# Patient Record
Sex: MALE | Race: WHITE | Employment: FULL TIME | URBAN - METROPOLITAN AREA
[De-identification: names, ages, dates, MRNs, and addresses within clinical notes are randomized per-mention and may not be internally consistent; named-entity substitution may affect disease eponyms.]

---

## 2020-09-14 ENCOUNTER — HOSPITAL ENCOUNTER (OUTPATIENT)
Dept: GENERAL RADIOLOGY | Age: 50
Discharge: HOME | End: 2020-09-14
Attending: PAIN MEDICINE
Payer: COMMERCIAL

## 2020-09-14 DIAGNOSIS — G89.4 CHRONIC PAIN SYNDROME: ICD-10-CM

## 2020-09-14 PROCEDURE — 72080 X-RAY EXAM THORACOLMB 2/> VW: CPT

## 2022-09-15 ENCOUNTER — OFFICE VISIT (OUTPATIENT)
Dept: ORTHOPEDIC SURGERY | Age: 52
End: 2022-09-15
Payer: COMMERCIAL

## 2022-09-15 VITALS — HEIGHT: 75 IN | BODY MASS INDEX: 34.82 KG/M2 | WEIGHT: 280 LBS

## 2022-09-15 DIAGNOSIS — G89.29 CHRONIC BILATERAL LOW BACK PAIN WITH LEFT-SIDED SCIATICA: Primary | ICD-10-CM

## 2022-09-15 DIAGNOSIS — M54.2 NECK PAIN: ICD-10-CM

## 2022-09-15 DIAGNOSIS — M48.02 CERVICAL STENOSIS OF SPINE: ICD-10-CM

## 2022-09-15 DIAGNOSIS — M54.42 CHRONIC BILATERAL LOW BACK PAIN WITH LEFT-SIDED SCIATICA: Primary | ICD-10-CM

## 2022-09-15 DIAGNOSIS — M48.061 SPINAL STENOSIS OF LUMBAR REGION WITHOUT NEUROGENIC CLAUDICATION: ICD-10-CM

## 2022-09-15 DIAGNOSIS — Z98.1 STATUS POST CERVICAL SPINAL FUSION: ICD-10-CM

## 2022-09-15 PROCEDURE — 99204 OFFICE O/P NEW MOD 45 MIN: CPT | Performed by: PHYSICIAN ASSISTANT

## 2022-09-15 RX ORDER — CARVEDILOL 25 MG/1
TABLET ORAL 2 TIMES DAILY
COMMUNITY
Start: 2022-07-17

## 2022-09-15 RX ORDER — MELOXICAM 15 MG/1
15 TABLET ORAL DAILY
COMMUNITY
Start: 2021-12-13 | End: 2023-02-07

## 2022-09-15 RX ORDER — LISINOPRIL 10 MG/1
TABLET ORAL DAILY
COMMUNITY
Start: 2022-07-25

## 2022-09-15 RX ORDER — ATORVASTATIN CALCIUM 20 MG/1
TABLET, FILM COATED ORAL
COMMUNITY
Start: 2022-07-25

## 2022-09-15 RX ORDER — FLUTICASONE PROPIONATE AND SALMETEROL 100; 50 UG/1; UG/1
1 POWDER RESPIRATORY (INHALATION) EVERY 12 HOURS
COMMUNITY

## 2022-09-15 RX ORDER — PREGABALIN 100 MG/1
CAPSULE ORAL 2 TIMES DAILY
COMMUNITY
Start: 2022-06-23

## 2022-09-15 RX ORDER — DULOXETIN HYDROCHLORIDE 30 MG/1
30 CAPSULE, DELAYED RELEASE ORAL 2 TIMES DAILY
COMMUNITY
Start: 2022-07-17

## 2022-09-15 RX ORDER — AMLODIPINE BESYLATE 5 MG/1
TABLET ORAL
COMMUNITY
Start: 2022-08-26

## 2022-09-15 RX ORDER — HYDROCHLOROTHIAZIDE 25 MG/1
TABLET ORAL DAILY
COMMUNITY
Start: 2022-07-21

## 2022-09-15 RX ORDER — FUROSEMIDE 40 MG/1
TABLET ORAL
COMMUNITY
Start: 2022-07-31 | End: 2022-11-03

## 2022-09-15 NOTE — PROGRESS NOTES
1. Have you been to the ER, urgent care clinic since your last visit? Hospitalized since your last visit? No    2. Have you seen or consulted any other health care providers outside of the [address] [place] [address] since your last visit? Include any pap smears or colon screening.  No    Chief Complaint   Patient presents with    Back Pain     Low Back, Left Leg, Left Foot Drop    Neck Pain

## 2022-09-15 NOTE — PROGRESS NOTES
Venus Thompson (: 1970) is a 49 y.o. male patient here for evaluation of the following chief complaint(s):  Back Pain (Low Back, Left Leg, Left Foot Drop) and Neck Pain         ASSESSMENT/PLAN:  Below is the assessment and plan developed based on review of pertinent history, physical exam, labs, studies, and medications. 1. Chronic bilateral low back pain with left-sided sciatica  -     XR SPINE LUMB MIN 4 V; Future  -     CT SPINE LUMB W CONT; Future  -     XR MYELO LUMBAR; Future  2. Neck pain  -     XR SPINE CERV 4 OR 5 V; Future  -     MRI CERV SPINE WO CONT; Future  3. Status post cervical spinal fusion  -     MRI CERV SPINE WO CONT; Future  4. Cervical stenosis of spine  -     MRI CERV SPINE WO CONT; Future  5. Spinal stenosis of lumbar region without neurogenic claudication  -     CT SPINE LUMB W CONT; Future  -     XR MYELO LUMBAR; Future      The patient's radiologic findings have been reviewed with him in detail today. He has a long standing history of chronic low back pain with residual right lower extremity weakness and residual left lower extremity radicular pain. He is status post laminectomy from 2019 and subsequent dorsal column stimulator implantation from 2020. Unfortunately despite his dorsal column stimulator and Lyrica, he has had continued and severe left lower extremity radiculopathy. He is having persistent weakness in his right leg and is having increased falls. He has a MRI lumbar spine from nearly 1 year ago without substantial compressive pathology. Given his persistent symptoms despite conservative management and dorsal column stimulator implantation as well as medication use and home exercises, would like for him to obtain a CT myelogram.    He is also exhibiting signs of cervical myelopathy with increasing falls and balance issues. His lower leg symptoms may be a result of cervical stenosis as well.   He has evidence of atrophy and weakness in his right hand that has been worsening progressively over the past month. I would like for him to obtain MRI of the cervical spine for further evaluation of cord compression. Return for a follow-up visit after his CT myelogram and MRIs are complete. He will use over-the-counter medications and continue with Lyrica per his PCP. Return for CT follow up, MRI follow up, With Dr. Susan Zeng. SUBJECTIVE/OBJECTIVE:  Sandi Ryan (: 1970) is a 49 y.o. male who presents today for the following:  Chief Complaint   Patient presents with    Back Pain     Low Back, Left Leg, Left Foot Drop    Neck Pain        Back Pain   Associated symptoms include numbness and weakness. Neck Pain     Mr. Kristan Christie as a new patient to the practice. He is status post prior C5-6 ACDF from  per Dr. Hannah García. Status post lumbar laminectomy from  per Dr. Hannah García as well. He had cord compression which prompted his ACDF with an acute HNP which prompted his lumbar ectomy. He states that he has had residual right leg weakness and persistent left lower extremity radiculopathy after his laminectomy. Ultimately due to his persistent pain, he had a dorsal column stimulator implanted by [facility] in . He continues with constant low back pain with left anterolateral thigh pain to the knee despite his stimulator. He is currently on meloxicam and Lyrica per his PCP. He states that his stimulator has not been helping in relieving his symptoms enough, however this remains unchanged. No numbness or tingling in the legs. He does report chronic right foot weakness. No bowel or bladder changes. He has also been seen by Gail Jeong last year and had MRI of the lumbar spine from nearly 1 year ago. Over the past several months, he has had difficulty with increasing balance issues and falls. He has had 2 falls over the past several months. He has had increasing posterior neck pain with radiation of the mid scapular region. No specific arm pain, but he does report numbness and tingling in his hands bilaterally. He has had increasing weakness in his right hand over the past month and notices difficulty with dexterity in the right hand and atrophy in the right forearm. IMAGING:  XR Results (most recent):  Results from Appointment encounter on 09/15/22    XR SPINE CERV 4 OR 5 V    Narrative  AP and lateral cervical spine demonstrates a stable cervical fusion C5-C6. No hardware complications noted. Spondylosis above and below. Spurring noted. No gross instability. XR SPINE LUMBAR  4 views of the lumbar spine were reviewed today. Previous laminectomy noted. Spinal cord stimulator placement with percutaneous leads noted. No gross instability with flexion-extension. No fractures or lytic lesion. MRI Results (most recent): 10/2021  Safety detector utilized prior to scan. NKI, Lower back pain. ,   T1/T2/STIR SAG, T2 AXIAL lwq. HISTORY:   Low back pain and lumbar radiculopathy with prior surgery     TECHNICAL FACTORS: , T1/T2/STIR SAG, T2 AXIAL. COMPARISON:  None. FINDINGS:  The L5-S1 level shows concentric bulging disc without focal disc herniation. Facet arthropathy. The neural foramina patent. The L4-5 level shows a prior left-sided laminectomy. Concentric bulging disc without of recurrent disc herniation at this level. Facet arthropathy. Moderate left L4-5 recess stenosis. The neural foramina patent. The L3-4 level shows concentric bulging disc and annular tears without focal disc herniation or central spinal stenosis. The neural foramina patent. The L2-3, L1-2 and T12-L1 levels show no evidence of disc herniation or spinal stenosis. The neural foramina patent. Conus and cauda equina are normal.     Paravertebral soft tissues are normal.     Prominent physiologic fat the posterior epidural space. No fracture dislocation identified. CONCLUSION:   1.  Postop changes at the L4-5 level the prior left-sided laminectomy and concentric bulging disc without recurrent disc herniation at this level. 2. Moderate left L4-5 recess stenosis. 3. Disc desiccation, concentric bulging disc at the L3-4 and L5-S1 levels with no other sites of lumbar disc herniation or conus or cauda equina compression. Thank you for the opportunity to provide your interpretation. Lindbergh Gaucher, MD      No Known Allergies    Current Outpatient Medications   Medication Sig    amLODIPine (NORVASC) 5 mg tablet     atorvastatin (LIPITOR) 20 mg tablet     carvediloL (COREG) 25 mg tablet     DULoxetine (CYMBALTA) 30 mg capsule     furosemide (LASIX) 40 mg tablet     hydroCHLOROthiazide (HYDRODIURIL) 25 mg tablet     lisinopriL (PRINIVIL, ZESTRIL) 10 mg tablet     meloxicam (MOBIC) 15 mg tablet Take 15 mg by mouth daily. pregabalin (LYRICA) 100 mg capsule     fluticasone propion-salmeteroL (Advair Diskus) 100-50 mcg/dose diskus inhaler Take 1 Puff by inhalation every twelve (12) hours. No current facility-administered medications for this visit. History reviewed. No pertinent past medical history. History reviewed. No pertinent surgical history. History reviewed. No pertinent family history. Social History     Tobacco Use    Smoking status: Never    Smokeless tobacco: Never   Substance Use Topics    Alcohol use: Not on file        Review of Systems   Constitutional: Negative. Respiratory: Negative. Cardiovascular: Negative. Gastrointestinal: Negative. Endocrine: Negative. Genitourinary: Negative. Musculoskeletal:  Positive for back pain, gait problem, neck pain and neck stiffness. Skin: Negative. Allergic/Immunologic: Negative. Neurological:  Positive for weakness and numbness. Hematological: Negative. Psychiatric/Behavioral: Negative. All other systems reviewed and are negative. No flowsheet data found.         Vitals:  Ht 6' 3\" (1.905 m)   Wt 280 lb (127 kg)   BMI 35.00 kg/m²    Body mass index is 35 kg/m². Physical Exam    Neurologic  Sensory  Light Touch - Intact - Globally. Overall Assessment of Muscle Strength and Tone reveals  Upper Extremities - Right Deltoid - 5/5. Left Deltoid - 5/5. Right Bicep - 5/5. Left Bicep - 5/5. Right Tricep - 5/5. Left Tricep - 5/5. Right Wrist Extensors - 5/5. Left Wrist Extensors - 5/5. Right Wrist Flexors - 5/5. Left Wrist Flexors - 5/5. Right Intrinsics - 3/5. Left Intrinsics - 5/5. Lower Extremities - Right Iliopsoas - 5/5. Left Iliopsoas - 5/5. Right Tibialis Anterior - 5/5. Left Tibialis Anterior - 5/5. Right Gastroc-Soleus - 5/5. Left Gastroc-Soleus - 5/5. Right EHL - 4/5. Left EHL - 5/5. Right medial forearm atrophy noted. General Assessment of Reflexes  Right Hand - Mcintosh's sign is positive in the right hand. Left Hand - Mcintosh's sign is positive in the left hand. Right Ankle - Clonus is not present. Left Ankle - Clonus is not present. Reflexes (Dermatomes)  2/2 Normal - Left Bicep (C5-6), Left Tricep (C7-8), Left Brachioradialis (C5-6), Right Bicep (C5-6), Right Tricep (C7-8) and Right Brachioradialis (C5-6). Left Achilles (L5-S2), Left Knee (L2-4), Right Achilles (L5-S2) and Right Knee (L2-4). Musculoskeletal  Global Assessment  Examination of related systems reveals - well-developed, well-nourished, in no acute distress, alert and oriented x 3 and normal coordination. Gait and Station - normal gait and station and normal posture. Spine/Ribs/Pelvis  Cervical Spine - Evaluation of related systems reveals - no lymphadenopathy and neurovascularly intact bilaterally. Inspection and Palpation - Tenderness - moderate and localized. Assessment of pain reveals the following findings: - Location - cervical area. ROJM - Flexion - 85 °. Right Lateral Flexion - 35 °. Left Lateral Flexion - 35 °. Extension - 70 °. Right Rotation - 80 °. Left Rotation - 80 °.  Cervical Spine - Functional Testing - Foraminal Compression/Spurling's Test negative, Shoulder Depression Test negative, Upper Limb Tension Test negative. Thoracic (Dorsal) Spine - Examination of the thoracic spine reveals - no tenderness over thoracic vertebrae, no pain, normal sensation and normal thoracic spine movements. Lumbosacral Spine - Examination of the lumbosacral spine reveals - no known fractures or deformities. Inspection and Palpation - Tenderness - moderate. Assessment of pain reveals the following findings - The pain is characterized as - moderate. Location - pain refers to lower back bilaterally. ROJM - Trunk Extension - 15 degrees. Lumbar Spine Flexion - 35 °. Lumbosacral Spine - Functional Testing - Babinski Test negative, Prone Knee Bending Test negative, Slump Test negative, Straight Leg Raising Test negative. Dr. Primitivo Mullins was available for immediate consult during this encounter. An electronic signature was used to authenticate this note.   -- Gerold Angelucci, PA

## 2022-10-05 ENCOUNTER — HOSPITAL ENCOUNTER (OUTPATIENT)
Dept: MRI IMAGING | Age: 52
Discharge: HOME | End: 2022-10-05
Attending: ORTHOPAEDIC SURGERY
Payer: COMMERCIAL

## 2022-10-05 DIAGNOSIS — M54.2 NECK PAIN: ICD-10-CM

## 2022-10-05 DIAGNOSIS — M48.02 CERVICAL STENOSIS OF SPINE: ICD-10-CM

## 2022-10-05 DIAGNOSIS — Z98.1 STATUS POST CERVICAL SPINAL FUSION: ICD-10-CM

## 2022-10-05 PROCEDURE — 72141 MRI NECK SPINE W/O DYE: CPT

## 2022-10-06 ENCOUNTER — HOSPITAL ENCOUNTER (OUTPATIENT)
Dept: GENERAL RADIOLOGY | Age: 52
Discharge: HOME | End: 2022-10-06
Attending: ORTHOPAEDIC SURGERY
Payer: COMMERCIAL

## 2022-10-06 ENCOUNTER — HOSPITAL ENCOUNTER (OUTPATIENT)
Dept: CT IMAGING | Age: 52
Discharge: HOME | End: 2022-10-06
Attending: ORTHOPAEDIC SURGERY
Payer: COMMERCIAL

## 2022-10-06 DIAGNOSIS — G89.29 CHRONIC BILATERAL LOW BACK PAIN WITH LEFT-SIDED SCIATICA: ICD-10-CM

## 2022-10-06 DIAGNOSIS — M54.42 CHRONIC BILATERAL LOW BACK PAIN WITH LEFT-SIDED SCIATICA: ICD-10-CM

## 2022-10-06 DIAGNOSIS — M48.061 SPINAL STENOSIS OF LUMBAR REGION WITHOUT NEUROGENIC CLAUDICATION: ICD-10-CM

## 2022-10-06 PROCEDURE — 74011000250 HC RX REV CODE- 250: Performed by: RADIOLOGY

## 2022-10-06 PROCEDURE — 74011000636 HC RX REV CODE- 636: Performed by: RADIOLOGY

## 2022-10-06 PROCEDURE — 62304 MYELOGRAPHY LUMBAR INJECTION: CPT

## 2022-10-06 PROCEDURE — 72132 CT LUMBAR SPINE W/DYE: CPT

## 2022-10-06 RX ORDER — LIDOCAINE HYDROCHLORIDE 20 MG/ML
5 INJECTION, SOLUTION EPIDURAL; INFILTRATION; INTRACAUDAL; PERINEURAL
Status: COMPLETED | OUTPATIENT
Start: 2022-10-06 | End: 2022-10-06

## 2022-10-06 RX ADMIN — LIDOCAINE HYDROCHLORIDE 100 MG: 20 INJECTION, SOLUTION INTRAVENOUS at 09:23

## 2022-10-06 RX ADMIN — IOHEXOL 15 ML: 180 INJECTION INTRAVENOUS at 09:22

## 2022-10-25 ENCOUNTER — OFFICE VISIT (OUTPATIENT)
Dept: ORTHOPEDIC SURGERY | Age: 52
End: 2022-10-25
Payer: COMMERCIAL

## 2022-10-25 VITALS — WEIGHT: 280 LBS | BODY MASS INDEX: 34.82 KG/M2 | HEIGHT: 75 IN

## 2022-10-25 DIAGNOSIS — G89.29 CHRONIC BILATERAL LOW BACK PAIN WITH LEFT-SIDED SCIATICA: Primary | ICD-10-CM

## 2022-10-25 DIAGNOSIS — M50.90 CERVICAL DISC DISEASE: ICD-10-CM

## 2022-10-25 DIAGNOSIS — M54.2 NECK PAIN: ICD-10-CM

## 2022-10-25 DIAGNOSIS — M54.42 CHRONIC BILATERAL LOW BACK PAIN WITH LEFT-SIDED SCIATICA: Primary | ICD-10-CM

## 2022-10-25 DIAGNOSIS — Z98.1 STATUS POST CERVICAL SPINAL FUSION: ICD-10-CM

## 2022-10-25 DIAGNOSIS — M48.061 SPINAL STENOSIS OF LUMBAR REGION WITHOUT NEUROGENIC CLAUDICATION: ICD-10-CM

## 2022-10-25 PROCEDURE — 99214 OFFICE O/P EST MOD 30 MIN: CPT | Performed by: ORTHOPAEDIC SURGERY

## 2022-10-25 NOTE — PATIENT INSTRUCTIONS
Lumbar Laminectomy: Before Your Surgery  What is a lumbar laminectomy? A lumbar laminectomy is surgery to ease pressure on the spinal cord and/or nerves of the lower spine. This is also called decompression surgery. The doctor makes a cut in the lower back. This cut is called an incision. Then the doctor takes out pieces of bone that are squeezing the spinal cord and nerves. The doctor may also take out other tissues. Many people have less pain soon after surgery. But your back may feel stiff and sore for a few months. Depending on the type of surgery you have, and your health, you may go home the same day. Or you may stay in the hospital for 1 or 2 days. You will likely return to work in 2 to 4 weeks. But if your job requires physical labor, it may take 4 to 8 weeks. How do you prepare for surgery? Surgery can be stressful. This information will help you understand what you can expect. And it will help you safely prepare for surgery. Preparing for surgery    Be sure you have someone to take you home. Anesthesia and pain medicine will make it unsafe for you to drive or get home on your own. Understand exactly what surgery is planned, along with the risks, benefits, and other options. If you take a medicine that prevents blood clots, your doctor may tell you to stop taking it before your surgery. Or your doctor may tell you to keep taking it. (These medicines include aspirin and other blood thinners.) Make sure that you understand exactly what your doctor wants you to do. Tell your doctor ALL the medicines, vitamins, supplements, and herbal remedies you take. Some may increase the risk of problems during your surgery. Your doctor will tell you if you should stop taking any of them before the surgery and how soon to do it. Make sure your doctor and the hospital have a copy of your advance directive. If you don't have one, you may want to prepare one.  It lets others know your health care wishes. It's a good thing to have before any type of surgery or procedure. What happens on the day of surgery? Follow the instructions exactly about when to stop eating and drinking. If you don't, your surgery may be canceled. If your doctor has told you to take your medicines on the day of surgery, take them with only a sip of water. Take a bath or shower before you come in for your surgery. Do not apply lotions, perfumes, deodorants, or nail polish. Do not shave the surgical site yourself. Take off all jewelry and piercings. And take out contact lenses, if you wear them. At the hospital or surgery center   Bring a picture ID. The area for surgery is often marked to make sure there are no errors. You will be kept comfortable and safe by your anesthesia provider. You will be asleep during the surgery. The surgery will take about 1 to 1½ hours. If a spinal fusion is done at the same time, surgery will last 2 to 4 hours. When should you call your doctor? You have questions or concerns. You don't understand how to prepare for your surgery. You become ill before the surgery (such as fever, flu, or a cold). You need to reschedule or have changed your mind about having the surgery. Where can you learn more? Go to http://www.gray.com/  Enter R777 in the search box to learn more about \"Lumbar Laminectomy: Before Your Surgery. \"  Current as of: March 9, 2022               Content Version: 13.4  © 2006-2022 [employer]. Care instructions adapted under license by [employer] (which disclaims liability or warranty for this information). If you have questions about a medical condition or this instruction, always ask your healthcare professional. [address] any warranty or liability for your use of this information.

## 2022-10-25 NOTE — PROGRESS NOTES
Renetta Muir (: 1970) is a 49 y.o. male patient here for evaluation of the following chief complaint(s):  LOW BACK PAIN         ASSESSMENT/PLAN:  Below is the assessment and plan developed based on review of pertinent history, physical exam, labs, studies, and medications. 1. Chronic bilateral low back pain with left-sided sciatica  2. Neck pain  3. Status post cervical spinal fusion  4. Spinal stenosis of lumbar region without neurogenic claudication  5. Cervical disc disease      The patient's radiologic findings have been reviewed with him in detail today. He has a long standing history of chronic low back pain with residual right lower extremity weakness and residual left lower extremity radicular pain. He is status post laminectomy from 2019 and subsequent dorsal column stimulator implantation from . Unfortunately despite his dorsal column stimulator and Lyrica, he has had continued and severe left lower extremity radiculopathy. He is having persistent symptoms. CT myelogram shows large disc protrusion at L4-5 with cephalad migration causing severe central and left lateral recess stenosis. I think is a candidate for a revision laminectomy for decompression with bilateral decompression at L4-5. I think we can avoid a fusion at this time. In terms of his neck he does have a right-sided protrusion at C7-T1 as well as moderate central stenosis C6-7 below his previous ACDF. Obviously would like to avoid further extension of his cervical fusion given that he most likely need a posterior approach. I am going to send him for some right-sided C6-7 and C7-T1 injections while we proceed with his lower back. Risk and benefits were discussed with him. Procedure: Revision lumbar laminectomy and discectomy L4-5      The risks and benefits were discussed at length with the patient and the patient has elected to proceed. Indications for surgery include failed conservative treatment. Alternative treatments, risks and the perioperative course were discussed with the patient. All questions were answered. The risks and benefits of the procedure were explained. Benefits include definitive diagnosis, relief of pain, elimination of deformity and improved function. Risks of surgery including bleeding, infection, weakness, numbness, CSF leak, failure to improve symptoms, exacerbation of medical co-morbidities and even death were discussed with the patient. No follow-ups on file. SUBJECTIVE/OBJECTIVE:  Jessica Manzano (: 1970) is a 49 y.o. male who presents today for the following:  Chief Complaint   Patient presents with    LOW BACK PAIN        He comes in today for follow-up. Since we last saw him he had an MRI of his cervical spine as well as a CT myelogram of his lumbar spine. He has persistent bilateral leg symptoms. It is intermittently painful in the right and left lower extremities. Initially was worse on the left side. The back pain is worse with activities. He denies any bowel bladder difficulties. He does have some unsteadiness of his gait. Back Pain   Associated symptoms include numbness and weakness. Neck Pain    LOW BACK PAIN     Mr. Jerome Speaker as a new patient to the practice. He is status post prior C5-6 ACDF from  per Dr. Donny Butcher. Status post lumbar laminectomy from  per Dr. Donny Butcher as well. He had cord compression which prompted his ACDF with an acute HNP which prompted his lumbar ectomy. He states that he has had residual right leg weakness and persistent left lower extremity radiculopathy after his laminectomy. Ultimately due to his persistent pain, he had a dorsal column stimulator implanted by [facility] in . He continues with constant low back pain with left anterolateral thigh pain to the knee despite his stimulator. He is currently on meloxicam and Lyrica per his PCP.   He states that his stimulator has not been helping in relieving his symptoms enough, however this remains unchanged. No numbness or tingling in the legs. He does report chronic right foot weakness. No bowel or bladder changes. He has also been seen by [facility] last year and had MRI of the lumbar spine from nearly 1 year ago. Over the past several months, he has had difficulty with increasing balance issues and falls. He has had 2 falls over the past several months. He has had increasing posterior neck pain with radiation of the mid scapular region. No specific arm pain, but he does report numbness and tingling in his hands bilaterally. He has had increasing weakness in his right hand over the past month and notices difficulty with dexterity in the right hand and atrophy in the right forearm. IMAGING:  XR Results (most recent):  Results from Hospital Encounter encounter on 10/06/22    XR MYELO LUMBAR    Narrative  PROCEDURE:  FLUOROSCOPIC GUIDED LUMBAR SPINE MYELOGRAM    HISTORY: Sai Robles is a 49years old Male with Chronic bilateral low  back pain with left-sided sciatica . :  Mary Porter NP; Salvatore Moore NP    ATTENDING:  Dr. Juan Antonio Chamorro:  After full discussion of the procedure, including risks, benefits and  alternatives, both verbal and written consent were obtained. TECHNIQUE: A timeout was called to verify the correct patient, procedure, site  and allergies. The patient was placed prone on the fluoroscopy table. Initial  images  were obtained. An appropriate site for myelogram was marked at the L5-S1 level. The skin was prepped and draped in sterile fashion. 1% lidocaine was utilized  for local anesthesia. A 22 gauge spinal needle was then inserted under  fluoroscopic guidance into the thecal sac. There was prompt return of  cerebrospinal fluid through the needle. Approximately 10 ml of Omnipaque 180  contrast was administered intrathecally under spot fluoroscopic guidance. The  needle was removed. Pressure was applied locally at the puncture site. A dry  sterile dressing was applied. There were no immediate complications. The  patient tolerated the procedure without difficulty. Additional fluoroscopic images were then obtained in multiple obliquities. COMPLICATIONS:  None    ESTIMATED BLOOD LOSS:  < 1 ml    AIR KERMA:  107.85 mGy    Impression  Technically successful fluoroscopic guided lumbar spine myelogram.  Separately  dictated CT myelogram report to follow. XR SPINE LUMBAR  4 views of the lumbar spine were reviewed today. Previous laminectomy noted. Spinal cord stimulator placement with percutaneous leads noted. No gross instability with flexion-extension. No fractures or lytic lesion. MRI Results (most recent): 10/2021    MRI Results (most recent):  Results from [place] encounter on 10/05/22    MRI CERV SPINE WO CONT    Narrative  EXAM: MRI CERV SPINE WO CONT  Clinical history: Cervical radiculopathy  INDICATION: . Cervicalgia    COMPARISON: None    TECHNIQUE: MR imaging of the cervical spine was performed using the following  sequences: sagittal T1, T2, STIR;  axial T2, T1.    CONTRAST:  None. FINDINGS:    There is congenital narrowing of the cervical canal. There is no Chiari or  syrinx. There is no fracture or dislocation. Vertebral body heights are  maintained. Trace cord myelomalacia is likely chronic in nature and related to  chronic degenerative changes at C5-6. The craniocervical junction is intact. Status post ACDF at C5-6 with partial  osseous fusion. The paraspinal soft tissues are within normal limits. C2-C3: Disc bulge. Mild facet arthropathy. Canal is patent. Foramina are patent. C3-C4: Mild facet arthropathy. Mild central protrusion. Mild canal stenosis. Mild right foraminal stenosis. C4-C5: Mild facet arthropathy. Circumferential bulge/osteophyte. Mild canal  stenosis. Foramina are patent.     C5-C6: Status post ACDF. Facet arthropathy is greater on the left. Canal  stenosis is mild. Moderate left foraminal stenosis. C6-C7: Disc desiccation loss of disc height. Circumferential  protrusion/osteophyte. Mild facet arthropathy. Moderate canal stenosis. Severe  left foraminal stenosis. Moderate right foraminal stenosis. C7-T1: Disc desiccation. Lobulated protrusion/osteophyte. Canal is patent. Moderate to severe right and mild left foraminal stenosis. Impression  Multilevel disc and facet degenerative change with congenital narrowing of the  cervical canal.    There is moderate canal, severe left and moderate right foraminal stenosis at  C6-7. Moderate to severe right and mild left foraminal stenosis at C7-T1. Status post ACDF at C5-6 with moderate left foraminal stenosis at C5-6. No  unusual postprocedural findings at C5-6. CT Results (most recent):  Results from Hospital Encounter encounter on 10/06/22    CT SPINE LUMB W CONT    Narrative  EXAM:  CT SPINE LUMB W CONT    INDICATION:  Lumbago and sciatica, left side    COMPARISON: Fluoroscopy-guided lumbar myelogram 10/6/2022, lumbosacral spine  radiographs 9/15/2022, MRI lumbar spine 10/4/2021    TECHNIQUE:  Following the lumbar myelogram thin spiral axial images were obtained from  approximately T12 down through the sacrum. Sagittal and coronal reconstructions  were created. CT dose reduction was achieved through use of a standardized  protocol tailored for this examination and automatic exposure control for dose  modulation. FINDINGS:    Alignment is within normal limits. There is no fracture or compression  deformity. Lower spinal cord is normal in appearance. Conus terminates at L1-L2. Spinal stimulator. Partially visualized 2.7 cm right adrenal adenoma; no  dedicated follow-up recommended. Atherosclerosis. Lower thoracic spine: No herniation or stenosis. L1-L2: Mild facet arthropathy. No stenosis. L2-L3: Moderate facet arthropathy.  No stenosis. L3-L4: Disc bulge with central annular fissure. Mild facet arthropathy. Ligament  flavum thickening. Mild spinal stenosis. Mild left foraminal stenosis. L4-L5: Inferiorly extending left subarticular disc extrusion impinging the  descending left L5 and S1 nerve roots. Moderate facet arthropathy. Moderate  spinal stenosis. Moderate left and mild right foraminal stenosis. L5-S1: Disc bulge. Endplate osteophytes. Moderate facet arthropathy. No spinal  stenosis. Mild left foraminal stenosis. Impression  1. Study note: Patient underwent a lumbar spine MRI on 10/4/2021 with the  spinal stimulator in place. If the patient did not experience any issues with  that exam, consider future evaluation with MRI to potentially avoid another  myelography procedure. 2. L4-L5 inferiorly extending left subarticular disc extrusion impinging the  descending left L5 and S1 nerve roots. Moderate spinal canal stenosis. Moderate  left neuroforaminal stenosis, and mild right neuroforaminal stenosis at this  level as well. 3.  L3-L4 mild spinal canal and mild left foraminal stenosis. 4. L5-S1 mild left foraminal stenosis. 5.  Incidental right adrenal adenoma. No Known Allergies    Current Outpatient Medications   Medication Sig    amLODIPine (NORVASC) 5 mg tablet     atorvastatin (LIPITOR) 20 mg tablet     carvediloL (COREG) 25 mg tablet     DULoxetine (CYMBALTA) 30 mg capsule     furosemide (LASIX) 40 mg tablet     hydroCHLOROthiazide (HYDRODIURIL) 25 mg tablet     lisinopriL (PRINIVIL, ZESTRIL) 10 mg tablet     meloxicam (MOBIC) 15 mg tablet Take 15 mg by mouth daily. pregabalin (LYRICA) 100 mg capsule     fluticasone propion-salmeteroL (ADVAIR/WIXELA) 100-50 mcg/dose diskus inhaler Take 1 Puff by inhalation every twelve (12) hours. No current facility-administered medications for this visit. History reviewed. No pertinent past medical history. History reviewed.  No pertinent surgical history. History reviewed. No pertinent family history. Social History     Tobacco Use    Smoking status: Never    Smokeless tobacco: Never   Substance Use Topics    Alcohol use: Not on file        Review of Systems   Constitutional: Negative. Respiratory: Negative. Cardiovascular: Negative. Gastrointestinal: Negative. Endocrine: Negative. Genitourinary: Negative. Musculoskeletal:  Positive for back pain, gait problem, neck pain and neck stiffness. Skin: Negative. Allergic/Immunologic: Negative. Neurological:  Positive for weakness and numbness. Hematological: Negative. Psychiatric/Behavioral: Negative. All other systems reviewed and are negative. Pain Assessment  10/25/2022   Location of Pain Back   Location Modifiers Posterior   Severity of Pain 7           Vitals:  Ht 6' 3\" (1.905 m)   Wt 280 lb (127 kg)   BMI 35.00 kg/m²    Body mass index is 35 kg/m². Physical Exam    Neurologic  Sensory  Light Touch - Intact - Globally. Overall Assessment of Muscle Strength and Tone reveals  Upper Extremities - Right Deltoid - 5/5. Left Deltoid - 5/5. Right Bicep - 5/5. Left Bicep - 5/5. Right Tricep - 5/5. Left Tricep - 5/5. Right Wrist Extensors - 5/5. Left Wrist Extensors - 5/5. Right Wrist Flexors - 5/5. Left Wrist Flexors - 5/5. Right Intrinsics - 3/5. Left Intrinsics - 5/5. Lower Extremities - Right Iliopsoas - 5/5. Left Iliopsoas - 5/5. Right Tibialis Anterior - 5/5. Left Tibialis Anterior - 5/5. Right Gastroc-Soleus - 5/5. Left Gastroc-Soleus - 5/5. Right EHL - 4/5. Left EHL - 5/5. Right medial forearm atrophy noted. General Assessment of Reflexes  Right Hand - Mcintosh's sign is positive in the right hand. Left Hand - Mcintosh's sign is positive in the left hand. Right Ankle - Clonus is not present. Left Ankle - Clonus is not present.   Reflexes (Dermatomes)  2/2 Normal - Left Bicep (C5-6), Left Tricep (C7-8), Left Brachioradialis (C5-6), Right Bicep (C5-6), Right Tricep (C7-8) and Right Brachioradialis (C5-6). Left Achilles (L5-S2), Left Knee (L2-4), Right Achilles (L5-S2) and Right Knee (L2-4). Musculoskeletal  Global Assessment  Examination of related systems reveals - well-developed, well-nourished, in no acute distress, alert and oriented x 3 and normal coordination. Gait and Station - normal gait and station and normal posture. Spine/Ribs/Pelvis  Cervical Spine - Evaluation of related systems reveals - no lymphadenopathy and neurovascularly intact bilaterally. Inspection and Palpation - Tenderness - moderate and localized. Assessment of pain reveals the following findings: - Location - cervical area. ROJM - Flexion - 85 °. Right Lateral Flexion - 35 °. Left Lateral Flexion - 35 °. Extension - 70 °. Right Rotation - 80 °. Left Rotation - 80 °. Cervical Spine - Functional Testing - Foraminal Compression/Spurling's Test negative, Shoulder Depression Test negative, Upper Limb Tension Test negative. Thoracic (Dorsal) Spine - Examination of the thoracic spine reveals - no tenderness over thoracic vertebrae, no pain, normal sensation and normal thoracic spine movements. Lumbosacral Spine - Examination of the lumbosacral spine reveals - no known fractures or deformities. Inspection and Palpation - Tenderness - moderate. Assessment of pain reveals the following findings - The pain is characterized as - moderate. Location - pain refers to lower back bilaterally. ROJM - Trunk Extension - 15 degrees. Lumbar Spine Flexion - 35 °. Lumbosacral Spine - Functional Testing - Babinski Test negative, Prone Knee Bending Test negative, Slump Test negative, Straight Leg Raising Test negative. An electronic signature was used to authenticate this note.   -- Sheila Schmid MD

## 2022-10-26 DIAGNOSIS — M48.061 SPINAL STENOSIS OF LUMBAR REGION WITHOUT NEUROGENIC CLAUDICATION: ICD-10-CM

## 2022-10-26 DIAGNOSIS — M48.02 CERVICAL STENOSIS OF SPINE: ICD-10-CM

## 2022-10-26 DIAGNOSIS — M54.2 NECK PAIN: Primary | ICD-10-CM

## 2022-10-31 NOTE — PROGRESS NOTES
Ahmet Yip (: 1970) is a 49 y.o. male patient here for evaluation of the following chief complaint(s):  LOW BACK PAIN           ASSESSMENT/PLAN:  Below is the assessment and plan developed based on review of pertinent history, physical exam, labs, studies, and medications. 1. Chronic bilateral low back pain with left-sided sciatica  2. Neck pain  3. Status post cervical spinal fusion  4. Spinal stenosis of lumbar region without neurogenic claudication  5. Cervical disc disease        The patient's radiologic findings have been reviewed with him in detail today. He has a long standing history of chronic low back pain with residual right lower extremity weakness and residual left lower extremity radicular pain. He is status post laminectomy from 2019 and subsequent dorsal column stimulator implantation from . Unfortunately despite his dorsal column stimulator and Lyrica, he has had continued and severe left lower extremity radiculopathy. He is having persistent symptoms. CT myelogram shows large disc protrusion at L4-5 with cephalad migration causing severe central and left lateral recess stenosis. I think is a candidate for a revision laminectomy for decompression with bilateral decompression at L4-5. I think we can avoid a fusion at this time. In terms of his neck he does have a right-sided protrusion at C7-T1 as well as moderate central stenosis C6-7 below his previous ACDF. Obviously would like to avoid further extension of his cervical fusion given that he most likely need a posterior approach. I am going to send him for some right-sided C6-7 and C7-T1 injections while we proceed with his lower back. Risk and benefits were discussed with him. Procedure: Revision lumbar laminectomy and discectomy L4-5   Date of Surgery Update:  Ahmet Yip was seen and examined. History and physical has been reviewed. The patient has been examined.  There have been no significant clinical changes since the completion of the originally dated History and Physical.    Signed By: Sheila Schmid MD     2022 7:22 AM             The risks and benefits were discussed at length with the patient and the patient has elected to proceed. Indications for surgery include failed conservative treatment. Alternative treatments, risks and the perioperative course were discussed with the patient. All questions were answered. The risks and benefits of the procedure were explained. Benefits include definitive diagnosis, relief of pain, elimination of deformity and improved function. Risks of surgery including bleeding, infection, weakness, numbness, CSF leak, failure to improve symptoms, exacerbation of medical co-morbidities and even death were discussed with the patient. No follow-ups on file. SUBJECTIVE/OBJECTIVE:  Edward Goyal (: 1970) is a 49 y.o. male who presents today for the following:      Chief Complaint   Patient presents with    LOW BACK PAIN         He comes in today for follow-up. Since we last saw him he had an MRI of his cervical spine as well as a CT myelogram of his lumbar spine. He has persistent bilateral leg symptoms. It is intermittently painful in the right and left lower extremities. Initially was worse on the left side. The back pain is worse with activities. He denies any bowel bladder difficulties. He does have some unsteadiness of his gait. Back Pain   Associated symptoms include numbness and weakness. Neck Pain     LOW BACK PAIN     Mr. Sol Hall as a new patient to the practice. He is status post prior C5-6 ACDF from  per Dr. Mo Santos. Status post lumbar laminectomy from  per Dr. Mo Santos as well. He had cord compression which prompted his ACDF with an acute HNP which prompted his lumbar ectomy.   He states that he has had residual right leg weakness and persistent left lower extremity radiculopathy after his laminectomy. Ultimately due to his persistent pain, he had a dorsal column stimulator implanted by [facility] Pr[facility]ak[facility] in 2020. He continues with constant low back pain with left anterolateral thigh pain to the knee despite his stimulator. He is currently on meloxicam and Lyrica per his PCP. He states that his stimulator has not been helping in relieving his symptoms enough, however this remains unchanged. No numbness or tingling in the legs. He does report chronic right foot weakness. No bowel or bladder changes. He has also been seen by [address] [place] [address] last year and had MRI of the lumbar spine from nearly 1 year ago. Over the past several months, he has had difficulty with increasing balance issues and falls. He has had 2 falls over the past several months. He has had increasing posterior neck pain with radiation of the mid scapular region. No specific arm pain, but he does report numbness and tingling in his hands bilaterally. He has had increasing weakness in his right hand over the past month and notices difficulty with dexterity in the right hand and atrophy in the right forearm. IMAGING:  XR Results (most recent):  Results from Hospital Encounter encounter on 10/06/22     XR MYELO LUMBAR     Narrative  PROCEDURE:  FLUOROSCOPIC GUIDED LUMBAR SPINE MYELOGRAM     HISTORY: Raegan Beatty is a 49years old Male with Chronic bilateral low  back pain with left-sided sciatica . :  Los Nava NP; Sugey Damon NP     ATTENDING:  Dr. Beni Sibley:  After full discussion of the procedure, including risks, benefits and  alternatives, both verbal and written consent were obtained. TECHNIQUE: A timeout was called to verify the correct patient, procedure, site  and allergies. The patient was placed prone on the fluoroscopy table. Initial  images  were obtained. An appropriate site for myelogram was marked at the L5-S1 level.   The skin was prepped and draped in sterile fashion. 1% lidocaine was utilized  for local anesthesia. A 22 gauge spinal needle was then inserted under  fluoroscopic guidance into the thecal sac. There was prompt return of  cerebrospinal fluid through the needle. Approximately 10 ml of Omnipaque 180  contrast was administered intrathecally under spot fluoroscopic guidance. The  needle was removed. Pressure was applied locally at the puncture site. A dry  sterile dressing was applied. There were no immediate complications. The  patient tolerated the procedure without difficulty. Additional fluoroscopic images were then obtained in multiple obliquities. COMPLICATIONS:  None     ESTIMATED BLOOD LOSS:  < 1 ml     AIR KERMA:  107.85 mGy     Impression  Technically successful fluoroscopic guided lumbar spine myelogram.  Separately  dictated CT myelogram report to follow. XR SPINE LUMBAR  4 views of the lumbar spine were reviewed today. Previous laminectomy noted. Spinal cord stimulator placement with percutaneous leads noted. No gross instability with flexion-extension. No fractures or lytic lesion. MRI Results (most recent): 10/2021     MRI Results (most recent):  Results from [place] encounter on 10/05/22     MRI CERV SPINE WO CONT     Narrative  EXAM: MRI CERV SPINE WO CONT  Clinical history: Cervical radiculopathy  INDICATION: . Cervicalgia     COMPARISON: None     TECHNIQUE: MR imaging of the cervical spine was performed using the following  sequences: sagittal T1, T2, STIR;  axial T2, T1.     CONTRAST:  None. FINDINGS:     There is congenital narrowing of the cervical canal. There is no Chiari or  syrinx. There is no fracture or dislocation. Vertebral body heights are  maintained. Trace cord myelomalacia is likely chronic in nature and related to  chronic degenerative changes at C5-6. The craniocervical junction is intact. Status post ACDF at C5-6 with partial  osseous fusion.      The paraspinal soft tissues are within normal limits. C2-C3: Disc bulge. Mild facet arthropathy. Canal is patent. Foramina are patent. C3-C4: Mild facet arthropathy. Mild central protrusion. Mild canal stenosis. Mild right foraminal stenosis. C4-C5: Mild facet arthropathy. Circumferential bulge/osteophyte. Mild canal  stenosis. Foramina are patent. C5-C6: Status post ACDF. Facet arthropathy is greater on the left. Canal  stenosis is mild. Moderate left foraminal stenosis. C6-C7: Disc desiccation loss of disc height. Circumferential  protrusion/osteophyte. Mild facet arthropathy. Moderate canal stenosis. Severe  left foraminal stenosis. Moderate right foraminal stenosis. C7-T1: Disc desiccation. Lobulated protrusion/osteophyte. Canal is patent. Moderate to severe right and mild left foraminal stenosis. Impression  Multilevel disc and facet degenerative change with congenital narrowing of the  cervical canal.     There is moderate canal, severe left and moderate right foraminal stenosis at  C6-7. Moderate to severe right and mild left foraminal stenosis at C7-T1. Status post ACDF at C5-6 with moderate left foraminal stenosis at C5-6. No  unusual postprocedural findings at C5-6. CT Results (most recent):  Results from Hospital Encounter encounter on 10/06/22     CT SPINE LUMB W CONT     Narrative  EXAM:  CT SPINE LUMB W CONT     INDICATION:  Lumbago and sciatica, left side     COMPARISON: Fluoroscopy-guided lumbar myelogram 10/6/2022, lumbosacral spine  radiographs 9/15/2022, MRI lumbar spine 10/4/2021     TECHNIQUE:  Following the lumbar myelogram thin spiral axial images were obtained from  approximately T12 down through the sacrum. Sagittal and coronal reconstructions  were created. CT dose reduction was achieved through use of a standardized  protocol tailored for this examination and automatic exposure control for dose  modulation. FINDINGS:     Alignment is within normal limits.  There is no fracture or compression  deformity. Lower spinal cord is normal in appearance. Conus terminates at L1-L2. Spinal stimulator. Partially visualized 2.7 cm right adrenal adenoma; no  dedicated follow-up recommended. Atherosclerosis. Lower thoracic spine: No herniation or stenosis. L1-L2: Mild facet arthropathy. No stenosis. L2-L3: Moderate facet arthropathy. No stenosis. L3-L4: Disc bulge with central annular fissure. Mild facet arthropathy. Ligament  flavum thickening. Mild spinal stenosis. Mild left foraminal stenosis. L4-L5: Inferiorly extending left subarticular disc extrusion impinging the  descending left L5 and S1 nerve roots. Moderate facet arthropathy. Moderate  spinal stenosis. Moderate left and mild right foraminal stenosis. L5-S1: Disc bulge. Endplate osteophytes. Moderate facet arthropathy. No spinal  stenosis. Mild left foraminal stenosis. Impression  1. Study note: Patient underwent a lumbar spine MRI on 10/4/2021 with the  spinal stimulator in place. If the patient did not experience any issues with  that exam, consider future evaluation with MRI to potentially avoid another  myelography procedure. 2. L4-L5 inferiorly extending left subarticular disc extrusion impinging the  descending left L5 and S1 nerve roots. Moderate spinal canal stenosis. Moderate  left neuroforaminal stenosis, and mild right neuroforaminal stenosis at this  level as well. 3.  L3-L4 mild spinal canal and mild left foraminal stenosis. 4. L5-S1 mild left foraminal stenosis. 5.  Incidental right adrenal adenoma.          No Known Allergies          Current Outpatient Medications   Medication Sig    amLODIPine (NORVASC) 5 mg tablet      atorvastatin (LIPITOR) 20 mg tablet      carvediloL (COREG) 25 mg tablet      DULoxetine (CYMBALTA) 30 mg capsule      furosemide (LASIX) 40 mg tablet      hydroCHLOROthiazide (HYDRODIURIL) 25 mg tablet      lisinopriL (PRINIVIL, ZESTRIL) 10 mg tablet meloxicam (MOBIC) 15 mg tablet Take 15 mg by mouth daily. pregabalin (LYRICA) 100 mg capsule      fluticasone propion-salmeteroL (ADVAIR/WIXELA) 100-50 mcg/dose diskus inhaler Take 1 Puff by inhalation every twelve (12) hours. No current facility-administered medications for this visit. History reviewed. No pertinent past medical history. History reviewed. No pertinent surgical history. History reviewed. No pertinent family history. Social History           Tobacco Use    Smoking status: Never    Smokeless tobacco: Never   Substance Use Topics    Alcohol use: Not on file         Review of Systems   Constitutional: Negative. Respiratory: Negative. Cardiovascular: Negative. Gastrointestinal: Negative. Endocrine: Negative. Genitourinary: Negative. Musculoskeletal:  Positive for back pain, gait problem, neck pain and neck stiffness. Skin: Negative. Allergic/Immunologic: Negative. Neurological:  Positive for weakness and numbness. Hematological: Negative. Psychiatric/Behavioral: Negative. All other systems reviewed and are negative. Pain Assessment  10/25/2022   Location of Pain Back   Location Modifiers Posterior   Severity of Pain 7             Vitals:  Ht 6' 3\" (1.905 m)   Wt 280 lb (127 kg)   BMI 35.00 kg/m²    Body mass index is 35 kg/m². Physical Exam     Neurologic  Sensory  Light Touch - Intact - Globally. Overall Assessment of Muscle Strength and Tone reveals  Upper Extremities - Right Deltoid - 5/5. Left Deltoid - 5/5. Right Bicep - 5/5. Left Bicep - 5/5. Right Tricep - 5/5. Left Tricep - 5/5. Right Wrist Extensors - 5/5. Left Wrist Extensors - 5/5. Right Wrist Flexors - 5/5. Left Wrist Flexors - 5/5. Right Intrinsics - 3/5. Left Intrinsics - 5/5. Lower Extremities - Right Iliopsoas - 5/5. Left Iliopsoas - 5/5. Right Tibialis Anterior - 5/5. Left Tibialis Anterior - 5/5. Right Gastroc-Soleus - 5/5. Left Gastroc-Soleus - 5/5.  Right EHL - 4/5. Left EHL - 5/5. Right medial forearm atrophy noted. General Assessment of Reflexes  Right Hand - Mcintosh's sign is positive in the right hand. Left Hand - Mcintosh's sign is positive in the left hand. Right Ankle - Clonus is not present. Left Ankle - Clonus is not present. Reflexes (Dermatomes)  2/2 Normal - Left Bicep (C5-6), Left Tricep (C7-8), Left Brachioradialis (C5-6), Right Bicep (C5-6), Right Tricep (C7-8) and Right Brachioradialis (C5-6). Left Achilles (L5-S2), Left Knee (L2-4), Right Achilles (L5-S2) and Right Knee (L2-4). Musculoskeletal  Global Assessment  Examination of related systems reveals - well-developed, well-nourished, in no acute distress, alert and oriented x 3 and normal coordination. Gait and Station - normal gait and station and normal posture. Spine/Ribs/Pelvis  Cervical Spine - Evaluation of related systems reveals - no lymphadenopathy and neurovascularly intact bilaterally. Inspection and Palpation - Tenderness - moderate and localized. Assessment of pain reveals the following findings: - Location - cervical area. ROJM - Flexion - 85 °. Right Lateral Flexion - 35 °. Left Lateral Flexion - 35 °. Extension - 70 °. Right Rotation - 80 °. Left Rotation - 80 °. Cervical Spine - Functional Testing - Foraminal Compression/Spurling's Test negative, Shoulder Depression Test negative, Upper Limb Tension Test negative. Thoracic (Dorsal) Spine - Examination of the thoracic spine reveals - no tenderness over thoracic vertebrae, no pain, normal sensation and normal thoracic spine movements. Lumbosacral Spine - Examination of the lumbosacral spine reveals - no known fractures or deformities. Inspection and Palpation - Tenderness - moderate. Assessment of pain reveals the following findings - The pain is characterized as - moderate. Location - pain refers to lower back bilaterally. ROJM - Trunk Extension - 15 degrees. Lumbar Spine Flexion - 35 °.  Lumbosacral Spine - Functional Testing - Babinski Test negative, Prone Knee Bending Test negative, Slump Test negative, Straight Leg Raising Test negative. An electronic signature was used to authenticate this note.   -- Kirt Paul MD

## 2022-11-03 ENCOUNTER — HOSPITAL ENCOUNTER (OUTPATIENT)
Dept: PREADMISSION TESTING | Age: 52
Discharge: HOME | End: 2022-11-03
Payer: COMMERCIAL

## 2022-11-03 VITALS
BODY MASS INDEX: 34.37 KG/M2 | DIASTOLIC BLOOD PRESSURE: 82 MMHG | WEIGHT: 276.4 LBS | HEIGHT: 75 IN | HEART RATE: 62 BPM | OXYGEN SATURATION: 99 % | TEMPERATURE: 97.7 F | SYSTOLIC BLOOD PRESSURE: 137 MMHG

## 2022-11-03 LAB
ABO + RH BLD: NORMAL
BLOOD GROUP ANTIBODIES SERPL: NORMAL
SPECIMEN EXP DATE BLD: NORMAL

## 2022-11-03 PROCEDURE — 36415 COLL VENOUS BLD VENIPUNCTURE: CPT

## 2022-11-03 PROCEDURE — 86900 BLOOD TYPING SEROLOGIC ABO: CPT

## 2022-11-03 RX ORDER — MOMETASONE FUROATE 50 UG/1
2 SPRAY, METERED NASAL DAILY
COMMUNITY

## 2022-11-03 NOTE — PERIOP NOTES
[address]  ORTHOPAEDIC    Surgery Date:   11/7/22    Your surgeon's office or [facility] staff will call you between 4 PM- 8 PM the day before surgery with your arrival time. If your surgery is on a Monday, you will receive a call the preceding Friday. Please report to [facility] Patient Access/Admitting on the 1st floor. Bring your insurance card, photo identification, and any copayment (if applicable). If you are going home the same day of your surgery, you must have a responsible adult to drive you home. You need to have a responsible adult to stay with you the first 24 hours after surgery and you should not drive a car for 24 hours following your surgery. Do NOT eat any solid foods after midnight the night before surgery including candy, mints or gum. You may drink clear liquids from midnight until 1 hour prior to arrival time. You may drink up to 12 ounces at one time every 4 hours. Do NOT drink alcohol or smoke 24 hours before surgery. STOP smoking for 14 days prior as it helps with breathing and healing after surgery. If your arrival time is 3pm or later, you may eat a light breakfast before 8am (toast, bagel-no butter, black coffee, plain tea, fruit juice-no pulp) Please note special instructions, if applicable, below for medications. If you are being admitted to the hospital,please leave personal belongings/luggage in your car until you have an assigned hospital room number. Please wear comfortable clothes. Wear your glasses instead of contacts. We ask that all money, jewelry and valuables be left at home. Wear no make up, particularly mascara, the day of surgery. All body piercings, rings, and jewelry need to be removed and left at home. Please remove any nail polish or artificial nails from your fingernails. Please wear your hair loose or down. Please no pony-tails, buns, or any metal hair accessories.  If you shower the morning of surgery, please do not apply any lotions or powders afterwards. You may wear deodorant. Do not shave any body area within 24 hours of your surgery. Please follow all instructions to avoid any potential surgical cancellation. Should your physical condition change, (i.e. fever, cold, flu, etc.) please notify your surgeon as soon as possible. It is important to be on time. If a situation occurs where you may be delayed, please call:  [phone] / [phone] on the day of surgery. The Preadmission Testing staff can be reached at [phone]. Special instructions: BRING YOUR CPAP    Current Outpatient Medications   Medication Sig    mometasone (Nasonex) 50 mcg/actuation nasal spray 2 Sprays daily. amLODIPine (NORVASC) 5 mg tablet nightly. atorvastatin (LIPITOR) 20 mg tablet nightly. carvediloL (COREG) 25 mg tablet two (2) times a day. DULoxetine (CYMBALTA) 30 mg capsule 30 mg two (2) times a day. hydroCHLOROthiazide (HYDRODIURIL) 25 mg tablet daily. lisinopriL (PRINIVIL, ZESTRIL) 10 mg tablet daily. pregabalin (LYRICA) 100 mg capsule two (2) times a day. fluticasone propion-salmeteroL (ADVAIR/WIXELA) 100-50 mcg/dose diskus inhaler Take 1 Puff by inhalation every twelve (12) hours. meloxicam (MOBIC) 15 mg tablet Take 15 mg by mouth daily. (Patient not taking: Reported on 11/3/2022)     No current facility-administered medications for this encounter. YOU MUST ONLY TAKE THESE MEDICATIONS THE MORNING OF SURGERY WITH A SIP OF WATER: NASONEX, CARVEDILOL, DULOXETINE, PREGABALIN, ADVAIR  MEDICATIONS TO TAKE THE MORNING OF SURGERY ONLY IF NEEDED: NONE  HOLD these prescription medications BEFORE Surgery: NONE  Ask your surgeon/prescribing physician about when/if to STOP taking these medications: NONE  Stop any non-steroidal anti-inflammatory drugs (i.e. Ibuprofen, Naproxen, Advil, Aleve) 7 days before surgery. You may take Tylenol. STOP all vitamins and herbal supplements 1 week prior to  surgery.   If you are currently taking Plavix, Coumadin, or any other blood-thinning/anticoagulant medication contact your prescribing physician for instructions. Preventing Infections Before and After - Your Surgery    IMPORTANT INSTRUCTIONS    You play an important role in your health and preparation for surgery. To reduce the germs on your skin you will need to shower with CHG soap (Chorhexidine gluconate 4%) two times before surgery. CHG soap (Hibiclens, Hex-A-Clens or store brand)  CHG soap will be provided at your Preadmission Testing (PAT) appointment. If you do not have a PAT appointment before surgery, you may arrange to  CHG soap from our office or purchase CHG soap at a pharmacy, grocery or department store. You need to purchase TWO 4 ounce bottles to use for your 2 showers. Steps to follow:  [place] your hair with your normal shampoo and your body with regular soap and rinse well to remove shampoo and soap from your skin. Wet a clean washcloth and turn off the shower. Put CHG soap on washcloth and apply to your entire body from the neck down. Do not use on your head, face or private parts(genitals). Do not use CHG soap on open sores, wounds or areas of skin irritation. Wash you body gently for 5 minutes. Do not wash your skin too hard. This soap does not create lather. Pay special attention to your underarms and from your belly button to your feet. Turn the shower back on and rinse well to get CHG soap off your body. Pat your skin dry with a clean, dry towel. Do not apply lotions or moisturizer. Put on clean clothes and sleep on fresh bed sheets and do not allow pets to sleep with you. Shower with CHG soap 2 times before your surgery  The evening before your surgery  The morning of your surgery      Tips to help prevent infections after your surgery:  Protect your surgical wound from germs:  Hand washing is the most important thing you and your caregivers can do to prevent infections.   Keep your bandage clean and dry! Do not touch your surgical wound. Use clean, freshly washed towels and washcloths every time you shower; do not share bath linens with others. Until your surgical wound is healed, wear clothing and sleep on bed linens each day that are clean and freshly washed. Do not allow pets to sleep in your bed with you or touch your surgical wound. Do not smoke - smoking delays wound healing. This may be a good time to stop smoking. If you have diabetes, it is important for you to manage your blood sugar levels properly before your surgery as well as after your surgery. Poorly managed blood sugar levels slow down wound healing and prevent you from healing completely. Prevention of Infection  Testing for Staphylococcus aureus on your skin before surgery    Staphylococcus aureus (staph) is a common bacteria that is found on the body. It normally does not cause infection on healthy skin. Before surgery, you will be tested to see if you have staph by swabbing the inside of your nose. When you have an incision with surgery, the goal is to protect that incision from infection. Removal of the staph bacteria before surgery can decrease the risk of a surgical site infection. If your nose swab is positive for staph you will be called. Your treatment will include 2 steps:  Prescription for Mupirocin ointment to be used in each nostril twice a day for 5 days. Showering with Chlorhexidine (CHG) liquid soap for 5 days prior to surgery. How to use Mupirocin ointment in your nose   the prescription from your pharmacy. You will receive a large tube of ointment which will be big enough for all of your treatments. You will apply this ointment to each nostril 2 times a day for 5 days. Wash your hands with  gel or soap and water for 20 seconds before using ointment. Place a pea-sized amount of ointment on a cotton Q-tip. Apply ointment just inside of each nostril with the Q-tip.  Do not push Q-tip or ointment deep inside you nose. Press your nostrils together and massage for a few seconds. Wash your hands with  gel or soap and water after you are finished. Do not get ointment near your eyes. If it gets into your eyes, rinse them with cool water. If you need to use nasal spray, clean the tip of the bottle with alcohol before use and do not use both at the same time. If you are scheduled for COVID testing during the 5 days, do NOT apply morning dose until after the COVID test has been performed. How to use Chlorhexidine (CHG) 4% liquid soap  Purchase an 8 ounce bottle of CHG liquid soap (Chlorhexidine 4%, Hibiclens, Hex-A-Clens or store brand) at a pharmacy or grocery store. Wash your hair with your normal shampoo and your body with regular soap and rinse well to remove shampoo and soap from your skin. Wet a clean washcloth and turn off the shower. Put CHG soap on washcloth and apply to your entire body from the neck down. Do not use on your head, face or private parts(genitals). Do not use CHG soap on open sores, wounds or areas of skin irritation. Wash your body gently for 5 minutes. Do not wash your skin too hard. This soap does not create lather. Pay special attention to your underarms and from your belly button to your feet. Turn the shower back on and rinse well to get CHG soap off your body. Pat your skin dry with a clean, dry towel. Do not apply lotions or moisturizer. Put on clean clothes and sleep on fresh bed sheets the night before surgery. Do not allow pets to sleep with you. Eating and Drinking Before Surgery    You may eat a regular dinner at the usual time on the day before your surgery. Do NOT eat any solid foods after midnight unless your arrival time at the hospital is 3pm or later. You may drink clear liquids only from 12 midnight until 1 hours prior to your arrival time at the hospital on the day of your surgery. Do NOT drink alcohol.   Clear liquids include:  Water  Fruit juices without pulp( i.e. apple juice)  Carbonated beverages  Black coffee (no cream/milk)  Tea (no cream/milk)  Gatorade  You may drink up to 12-16 ounces at one time every 4 hours between the hours of midnight and 1 hour before your arrival time at the hospital. Example- if your arrival time at the hospital is 6am, you may drink 12-16 ounces of clear liquids no later than 5am.  If your arrival time at the hospital is 3pm or later, you may eat a light breakfast before 8am.  A light breakfast includes: Toast or bagel (no butter)  Black coffee (no cream/milk)  Tea (no cream/milk)  Fruit juices without pulp ( i.e. apple juice)  Do NOT eat meat, eggs, vegetables or fruit  If you have any questions, please contact your surgeon's office. Patient Information Regarding COVID Restrictions    Day of Procedure    Please park in the parking deck or any designated visitor parking lot. Enter the facility through the Main Entrance of the hospital.  On the day of surgery, please provide the cell phone number of the person who will be waiting for you to the Patient Access representative at the time of registration. Masks are highly recommended in the hospital, but not required. Once your procedure and the immediate recovery period is completed, a nurse in the recovery area will contact your designated visitor to inform them of your room number or to otherwise review other pertinent information regarding your care. Social distancing practices are strongly encouraged in waiting areas and the cafeteria. The patient was contacted in person. He verbalized understanding of all instructions and does not  need reinforcement.

## 2022-11-04 LAB
BACTERIA SPEC CULT: NORMAL
BACTERIA SPEC CULT: NORMAL
SERVICE CMNT-IMP: NORMAL

## 2022-11-07 ENCOUNTER — APPOINTMENT (OUTPATIENT)
Dept: GENERAL RADIOLOGY | Age: 52
End: 2022-11-07
Attending: ORTHOPAEDIC SURGERY
Payer: COMMERCIAL

## 2022-11-07 ENCOUNTER — HOSPITAL ENCOUNTER (OUTPATIENT)
Age: 52
Discharge: HOME | End: 2022-11-07
Attending: ORTHOPAEDIC SURGERY | Admitting: ORTHOPAEDIC SURGERY
Payer: COMMERCIAL

## 2022-11-07 ENCOUNTER — ANESTHESIA EVENT (OUTPATIENT)
Dept: SURGERY | Age: 52
End: 2022-11-07
Payer: COMMERCIAL

## 2022-11-07 ENCOUNTER — ANESTHESIA (OUTPATIENT)
Dept: SURGERY | Age: 52
End: 2022-11-07
Payer: COMMERCIAL

## 2022-11-07 VITALS
OXYGEN SATURATION: 95 % | HEART RATE: 58 BPM | RESPIRATION RATE: 10 BRPM | SYSTOLIC BLOOD PRESSURE: 126 MMHG | TEMPERATURE: 97.8 F | DIASTOLIC BLOOD PRESSURE: 76 MMHG

## 2022-11-07 DIAGNOSIS — M48.062 SPINAL STENOSIS OF LUMBAR REGION WITH NEUROGENIC CLAUDICATION: Primary | ICD-10-CM

## 2022-11-07 DIAGNOSIS — Z98.890 S/P LUMBAR LAMINECTOMY: ICD-10-CM

## 2022-11-07 PROBLEM — M48.061 LUMBAR STENOSIS: Status: ACTIVE | Noted: 2022-11-07

## 2022-11-07 LAB
GLUCOSE BLD STRIP.AUTO-MCNC: 102 MG/DL (ref 65–117)
HGB BLD-MCNC: 14.4 G/DL (ref 12.1–17)
SERVICE CMNT-IMP: NORMAL

## 2022-11-07 PROCEDURE — 76210000016 HC OR PH I REC 1 TO 1.5 HR: Performed by: ORTHOPAEDIC SURGERY

## 2022-11-07 PROCEDURE — 77030033138 HC SUT PGA STRATFX J&J -B: Performed by: ORTHOPAEDIC SURGERY

## 2022-11-07 PROCEDURE — 77030035236 HC SUT PDS STRATFX BARB J&J -B: Performed by: ORTHOPAEDIC SURGERY

## 2022-11-07 PROCEDURE — 76060000035 HC ANESTHESIA 2 TO 2.5 HR: Performed by: ORTHOPAEDIC SURGERY

## 2022-11-07 PROCEDURE — 77030029099 HC BN WAX SSPC -A: Performed by: ORTHOPAEDIC SURGERY

## 2022-11-07 PROCEDURE — 85018 HEMOGLOBIN: CPT

## 2022-11-07 PROCEDURE — 74011000250 HC RX REV CODE- 250: Performed by: NURSE ANESTHETIST, CERTIFIED REGISTERED

## 2022-11-07 PROCEDURE — 74011250636 HC RX REV CODE- 250/636: Performed by: NURSE ANESTHETIST, CERTIFIED REGISTERED

## 2022-11-07 PROCEDURE — 82962 GLUCOSE BLOOD TEST: CPT

## 2022-11-07 PROCEDURE — 2709999900 HC NON-CHARGEABLE SUPPLY: Performed by: ORTHOPAEDIC SURGERY

## 2022-11-07 PROCEDURE — 74011250637 HC RX REV CODE- 250/637: Performed by: ANESTHESIOLOGY

## 2022-11-07 PROCEDURE — 74011250636 HC RX REV CODE- 250/636: Performed by: ANESTHESIOLOGY

## 2022-11-07 PROCEDURE — 76010000171 HC OR TIME 2 TO 2.5 HR INTENSV-TIER 1: Performed by: ORTHOPAEDIC SURGERY

## 2022-11-07 PROCEDURE — 77030014650 HC SEAL MTRX FLOSEL BAXT -C: Performed by: ORTHOPAEDIC SURGERY

## 2022-11-07 PROCEDURE — 77030038600 HC TU BPLR IRR DISP STRY -B: Performed by: ORTHOPAEDIC SURGERY

## 2022-11-07 PROCEDURE — 74011250636 HC RX REV CODE- 250/636: Performed by: ORTHOPAEDIC SURGERY

## 2022-11-07 PROCEDURE — 77030026438 HC STYL ET INTUB CARD -A: Performed by: ANESTHESIOLOGY

## 2022-11-07 PROCEDURE — 74011000250 HC RX REV CODE- 250: Performed by: ORTHOPAEDIC SURGERY

## 2022-11-07 PROCEDURE — C1889 IMPLANT/INSERT DEVICE, NOC: HCPCS | Performed by: ORTHOPAEDIC SURGERY

## 2022-11-07 PROCEDURE — 77030008684 HC TU ET CUF COVD -B: Performed by: ANESTHESIOLOGY

## 2022-11-07 PROCEDURE — 76210000020 HC REC RM PH II FIRST 0.5 HR: Performed by: ORTHOPAEDIC SURGERY

## 2022-11-07 PROCEDURE — 77030013079 HC BLNKT BAIR HGGR 3M -A: Performed by: ANESTHESIOLOGY

## 2022-11-07 DEVICE — GRAFT HUM TISS 3X4 CM WND COVERING AMNIO MEMBRN VERSASHIELD: Type: IMPLANTABLE DEVICE | Site: SPINE LUMBAR | Status: FUNCTIONAL

## 2022-11-07 RX ORDER — ONDANSETRON 2 MG/ML
4 INJECTION INTRAMUSCULAR; INTRAVENOUS AS NEEDED
Status: DISCONTINUED | OUTPATIENT
Start: 2022-11-07 | End: 2022-11-07 | Stop reason: HOSPADM

## 2022-11-07 RX ORDER — NALOXONE HYDROCHLORIDE 4 MG/.1ML
SPRAY NASAL
Qty: 1 EACH | Refills: 0 | Status: SHIPPED | OUTPATIENT
Start: 2022-11-07

## 2022-11-07 RX ORDER — MIDAZOLAM HYDROCHLORIDE 1 MG/ML
INJECTION, SOLUTION INTRAMUSCULAR; INTRAVENOUS AS NEEDED
Status: DISCONTINUED | OUTPATIENT
Start: 2022-11-07 | End: 2022-11-07 | Stop reason: HOSPADM

## 2022-11-07 RX ORDER — ARFORMOTEROL TARTRATE 15 UG/2ML
15 SOLUTION RESPIRATORY (INHALATION)
Status: CANCELLED | OUTPATIENT
Start: 2022-11-07

## 2022-11-07 RX ORDER — LIDOCAINE HYDROCHLORIDE 20 MG/ML
INJECTION, SOLUTION EPIDURAL; INFILTRATION; INTRACAUDAL; PERINEURAL AS NEEDED
Status: DISCONTINUED | OUTPATIENT
Start: 2022-11-07 | End: 2022-11-07 | Stop reason: HOSPADM

## 2022-11-07 RX ORDER — HYDROMORPHONE HYDROCHLORIDE 2 MG/ML
INJECTION, SOLUTION INTRAMUSCULAR; INTRAVENOUS; SUBCUTANEOUS AS NEEDED
Status: DISCONTINUED | OUTPATIENT
Start: 2022-11-07 | End: 2022-11-07 | Stop reason: HOSPADM

## 2022-11-07 RX ORDER — SODIUM CHLORIDE 0.9 % (FLUSH) 0.9 %
5-40 SYRINGE (ML) INJECTION AS NEEDED
Status: DISCONTINUED | OUTPATIENT
Start: 2022-11-07 | End: 2022-11-07 | Stop reason: HOSPADM

## 2022-11-07 RX ORDER — SUCCINYLCHOLINE CHLORIDE 20 MG/ML
INJECTION INTRAMUSCULAR; INTRAVENOUS AS NEEDED
Status: DISCONTINUED | OUTPATIENT
Start: 2022-11-07 | End: 2022-11-07 | Stop reason: HOSPADM

## 2022-11-07 RX ORDER — FACIAL-BODY WIPES
10 EACH TOPICAL DAILY PRN
Status: CANCELLED | OUTPATIENT
Start: 2022-11-09

## 2022-11-07 RX ORDER — SODIUM CHLORIDE, SODIUM LACTATE, POTASSIUM CHLORIDE, CALCIUM CHLORIDE 600; 310; 30; 20 MG/100ML; MG/100ML; MG/100ML; MG/100ML
125 INJECTION, SOLUTION INTRAVENOUS CONTINUOUS
Status: DISCONTINUED | OUTPATIENT
Start: 2022-11-07 | End: 2022-11-07 | Stop reason: HOSPADM

## 2022-11-07 RX ORDER — SODIUM CHLORIDE 0.9 % (FLUSH) 0.9 %
5-40 SYRINGE (ML) INJECTION EVERY 8 HOURS
Status: DISCONTINUED | OUTPATIENT
Start: 2022-11-07 | End: 2022-11-07 | Stop reason: HOSPADM

## 2022-11-07 RX ORDER — ACETAMINOPHEN 325 MG/1
650 TABLET ORAL ONCE
Status: COMPLETED | OUTPATIENT
Start: 2022-11-07 | End: 2022-11-07

## 2022-11-07 RX ORDER — OXYCODONE HYDROCHLORIDE 5 MG/1
10 TABLET ORAL
Status: CANCELLED | OUTPATIENT
Start: 2022-11-07

## 2022-11-07 RX ORDER — HYDROMORPHONE HYDROCHLORIDE 1 MG/ML
0.2 INJECTION, SOLUTION INTRAMUSCULAR; INTRAVENOUS; SUBCUTANEOUS
Status: DISCONTINUED | OUTPATIENT
Start: 2022-11-07 | End: 2022-11-07 | Stop reason: HOSPADM

## 2022-11-07 RX ORDER — VANCOMYCIN HYDROCHLORIDE 1 G/20ML
INJECTION, POWDER, LYOPHILIZED, FOR SOLUTION INTRAVENOUS AS NEEDED
Status: DISCONTINUED | OUTPATIENT
Start: 2022-11-07 | End: 2022-11-07 | Stop reason: HOSPADM

## 2022-11-07 RX ORDER — LISINOPRIL 10 MG/1
10 TABLET ORAL DAILY
Status: CANCELLED | OUTPATIENT
Start: 2022-11-08

## 2022-11-07 RX ORDER — SODIUM CHLORIDE 0.9 % (FLUSH) 0.9 %
5-40 SYRINGE (ML) INJECTION EVERY 8 HOURS
Status: CANCELLED | OUTPATIENT
Start: 2022-11-07

## 2022-11-07 RX ORDER — MORPHINE SULFATE 2 MG/ML
2 INJECTION, SOLUTION INTRAMUSCULAR; INTRAVENOUS
Status: DISCONTINUED | OUTPATIENT
Start: 2022-11-07 | End: 2022-11-07 | Stop reason: HOSPADM

## 2022-11-07 RX ORDER — MIDAZOLAM HYDROCHLORIDE 1 MG/ML
1 INJECTION, SOLUTION INTRAMUSCULAR; INTRAVENOUS AS NEEDED
Status: DISCONTINUED | OUTPATIENT
Start: 2022-11-07 | End: 2022-11-07 | Stop reason: HOSPADM

## 2022-11-07 RX ORDER — POLYETHYLENE GLYCOL 3350 17 G/17G
17 POWDER, FOR SOLUTION ORAL DAILY
Status: CANCELLED | OUTPATIENT
Start: 2022-11-08

## 2022-11-07 RX ORDER — OXYCODONE HYDROCHLORIDE 5 MG/1
5-10 TABLET ORAL
Qty: 60 TABLET | Refills: 0 | Status: SHIPPED | OUTPATIENT
Start: 2022-11-07 | End: 2022-11-12

## 2022-11-07 RX ORDER — ONDANSETRON 2 MG/ML
INJECTION INTRAMUSCULAR; INTRAVENOUS AS NEEDED
Status: DISCONTINUED | OUTPATIENT
Start: 2022-11-07 | End: 2022-11-07 | Stop reason: HOSPADM

## 2022-11-07 RX ORDER — GLYCOPYRROLATE 0.2 MG/ML
INJECTION INTRAMUSCULAR; INTRAVENOUS AS NEEDED
Status: DISCONTINUED | OUTPATIENT
Start: 2022-11-07 | End: 2022-11-07 | Stop reason: HOSPADM

## 2022-11-07 RX ORDER — FENTANYL CITRATE 50 UG/ML
INJECTION, SOLUTION INTRAMUSCULAR; INTRAVENOUS AS NEEDED
Status: DISCONTINUED | OUTPATIENT
Start: 2022-11-07 | End: 2022-11-07 | Stop reason: HOSPADM

## 2022-11-07 RX ORDER — OXYCODONE AND ACETAMINOPHEN 5; 325 MG/1; MG/1
1 TABLET ORAL AS NEEDED
Status: DISCONTINUED | OUTPATIENT
Start: 2022-11-07 | End: 2022-11-07 | Stop reason: HOSPADM

## 2022-11-07 RX ORDER — BUDESONIDE 0.25 MG/2ML
250 INHALANT ORAL
Status: CANCELLED | OUTPATIENT
Start: 2022-11-07

## 2022-11-07 RX ORDER — NEOSTIGMINE METHYLSULFATE 1 MG/ML
INJECTION, SOLUTION INTRAVENOUS AS NEEDED
Status: DISCONTINUED | OUTPATIENT
Start: 2022-11-07 | End: 2022-11-07 | Stop reason: HOSPADM

## 2022-11-07 RX ORDER — CARVEDILOL 12.5 MG/1
25 TABLET ORAL 2 TIMES DAILY
Status: CANCELLED | OUTPATIENT
Start: 2022-11-07

## 2022-11-07 RX ORDER — OXYCODONE HYDROCHLORIDE 5 MG/1
5 TABLET ORAL
Status: CANCELLED | OUTPATIENT
Start: 2022-11-07

## 2022-11-07 RX ORDER — AMLODIPINE BESYLATE 5 MG/1
5 TABLET ORAL
Status: CANCELLED | OUTPATIENT
Start: 2022-11-07

## 2022-11-07 RX ORDER — MIDAZOLAM HYDROCHLORIDE 1 MG/ML
0.5 INJECTION, SOLUTION INTRAMUSCULAR; INTRAVENOUS
Status: DISCONTINUED | OUTPATIENT
Start: 2022-11-07 | End: 2022-11-07 | Stop reason: HOSPADM

## 2022-11-07 RX ORDER — HYDROCHLOROTHIAZIDE 25 MG/1
25 TABLET ORAL DAILY
Status: CANCELLED | OUTPATIENT
Start: 2022-11-08

## 2022-11-07 RX ORDER — ATORVASTATIN CALCIUM 20 MG/1
20 TABLET, FILM COATED ORAL
Status: CANCELLED | OUTPATIENT
Start: 2022-11-07

## 2022-11-07 RX ORDER — SODIUM CHLORIDE 0.9 % (FLUSH) 0.9 %
5-40 SYRINGE (ML) INJECTION AS NEEDED
Status: CANCELLED | OUTPATIENT
Start: 2022-11-07

## 2022-11-07 RX ORDER — SODIUM CHLORIDE 9 MG/ML
125 INJECTION, SOLUTION INTRAVENOUS CONTINUOUS
Status: DISCONTINUED | OUTPATIENT
Start: 2022-11-07 | End: 2022-11-07 | Stop reason: HOSPADM

## 2022-11-07 RX ORDER — FENTANYL CITRATE 50 UG/ML
25 INJECTION, SOLUTION INTRAMUSCULAR; INTRAVENOUS
Status: DISCONTINUED | OUTPATIENT
Start: 2022-11-07 | End: 2022-11-07 | Stop reason: HOSPADM

## 2022-11-07 RX ORDER — DIPHENHYDRAMINE HYDROCHLORIDE 50 MG/ML
12.5 INJECTION, SOLUTION INTRAMUSCULAR; INTRAVENOUS AS NEEDED
Status: DISCONTINUED | OUTPATIENT
Start: 2022-11-07 | End: 2022-11-07 | Stop reason: HOSPADM

## 2022-11-07 RX ORDER — DULOXETIN HYDROCHLORIDE 30 MG/1
30 CAPSULE, DELAYED RELEASE ORAL 2 TIMES DAILY
Status: CANCELLED | OUTPATIENT
Start: 2022-11-07

## 2022-11-07 RX ORDER — DEXAMETHASONE SODIUM PHOSPHATE 4 MG/ML
INJECTION, SOLUTION INTRA-ARTICULAR; INTRALESIONAL; INTRAMUSCULAR; INTRAVENOUS; SOFT TISSUE AS NEEDED
Status: DISCONTINUED | OUTPATIENT
Start: 2022-11-07 | End: 2022-11-07 | Stop reason: HOSPADM

## 2022-11-07 RX ORDER — NALOXONE HYDROCHLORIDE 0.4 MG/ML
0.4 INJECTION, SOLUTION INTRAMUSCULAR; INTRAVENOUS; SUBCUTANEOUS AS NEEDED
Status: CANCELLED | OUTPATIENT
Start: 2022-11-07

## 2022-11-07 RX ORDER — PROPOFOL 10 MG/ML
INJECTION, EMULSION INTRAVENOUS AS NEEDED
Status: DISCONTINUED | OUTPATIENT
Start: 2022-11-07 | End: 2022-11-07 | Stop reason: HOSPADM

## 2022-11-07 RX ORDER — ROCURONIUM BROMIDE 10 MG/ML
INJECTION, SOLUTION INTRAVENOUS AS NEEDED
Status: DISCONTINUED | OUTPATIENT
Start: 2022-11-07 | End: 2022-11-07 | Stop reason: HOSPADM

## 2022-11-07 RX ORDER — AMOXICILLIN 250 MG
1 CAPSULE ORAL 2 TIMES DAILY
Status: CANCELLED | OUTPATIENT
Start: 2022-11-07

## 2022-11-07 RX ORDER — FENTANYL CITRATE 50 UG/ML
50 INJECTION, SOLUTION INTRAMUSCULAR; INTRAVENOUS AS NEEDED
Status: DISCONTINUED | OUTPATIENT
Start: 2022-11-07 | End: 2022-11-07 | Stop reason: HOSPADM

## 2022-11-07 RX ORDER — PREGABALIN 100 MG/1
100 CAPSULE ORAL 2 TIMES DAILY
Status: CANCELLED | OUTPATIENT
Start: 2022-11-07

## 2022-11-07 RX ORDER — LIDOCAINE HYDROCHLORIDE 10 MG/ML
0.1 INJECTION, SOLUTION EPIDURAL; INFILTRATION; INTRACAUDAL; PERINEURAL AS NEEDED
Status: DISCONTINUED | OUTPATIENT
Start: 2022-11-07 | End: 2022-11-07 | Stop reason: HOSPADM

## 2022-11-07 RX ORDER — SODIUM CHLORIDE 9 MG/ML
25 INJECTION, SOLUTION INTRAVENOUS CONTINUOUS
Status: DISCONTINUED | OUTPATIENT
Start: 2022-11-07 | End: 2022-11-07 | Stop reason: HOSPADM

## 2022-11-07 RX ORDER — ACETAMINOPHEN 500 MG
1000 TABLET ORAL EVERY 6 HOURS
Status: CANCELLED | OUTPATIENT
Start: 2022-11-07

## 2022-11-07 RX ORDER — SODIUM CHLORIDE, SODIUM LACTATE, POTASSIUM CHLORIDE, CALCIUM CHLORIDE 600; 310; 30; 20 MG/100ML; MG/100ML; MG/100ML; MG/100ML
INJECTION, SOLUTION INTRAVENOUS
Status: DISCONTINUED | OUTPATIENT
Start: 2022-11-07 | End: 2022-11-07 | Stop reason: HOSPADM

## 2022-11-07 RX ORDER — MORPHINE SULFATE 2 MG/ML
2 INJECTION, SOLUTION INTRAMUSCULAR; INTRAVENOUS
Status: CANCELLED | OUTPATIENT
Start: 2022-11-07 | End: 2022-11-08

## 2022-11-07 RX ADMIN — ROCURONIUM BROMIDE 10 MG: 10 SOLUTION INTRAVENOUS at 12:48

## 2022-11-07 RX ADMIN — NEOSTIGMINE METHYLSULFATE 4 MG: 1 INJECTION, SOLUTION INTRAVENOUS at 14:37

## 2022-11-07 RX ADMIN — SUCCINYLCHOLINE CHLORIDE 200 MG: 20 INJECTION, SOLUTION INTRAMUSCULAR; INTRAVENOUS at 12:48

## 2022-11-07 RX ADMIN — MIDAZOLAM HYDROCHLORIDE 1 MG: 1 INJECTION, SOLUTION INTRAMUSCULAR; INTRAVENOUS at 12:40

## 2022-11-07 RX ADMIN — ROCURONIUM BROMIDE 40 MG: 10 SOLUTION INTRAVENOUS at 12:51

## 2022-11-07 RX ADMIN — ACETAMINOPHEN 650 MG: 325 TABLET ORAL at 12:09

## 2022-11-07 RX ADMIN — SODIUM CHLORIDE, POTASSIUM CHLORIDE, SODIUM LACTATE AND CALCIUM CHLORIDE 125 ML/HR: 600; 310; 30; 20 INJECTION, SOLUTION INTRAVENOUS at 12:11

## 2022-11-07 RX ADMIN — SODIUM CHLORIDE, POTASSIUM CHLORIDE, SODIUM LACTATE AND CALCIUM CHLORIDE: 600; 310; 30; 20 INJECTION, SOLUTION INTRAVENOUS at 12:38

## 2022-11-07 RX ADMIN — ONDANSETRON HYDROCHLORIDE 4 MG: 2 INJECTION, SOLUTION INTRAMUSCULAR; INTRAVENOUS at 14:25

## 2022-11-07 RX ADMIN — SODIUM CHLORIDE, POTASSIUM CHLORIDE, SODIUM LACTATE AND CALCIUM CHLORIDE: 600; 310; 30; 20 INJECTION, SOLUTION INTRAVENOUS at 14:29

## 2022-11-07 RX ADMIN — PROPOFOL 250 MG: 10 INJECTION, EMULSION INTRAVENOUS at 12:48

## 2022-11-07 RX ADMIN — LIDOCAINE HYDROCHLORIDE 100 MG: 20 INJECTION, SOLUTION EPIDURAL; INFILTRATION; INTRACAUDAL; PERINEURAL at 12:48

## 2022-11-07 RX ADMIN — MIDAZOLAM HYDROCHLORIDE 2 MG: 1 INJECTION, SOLUTION INTRAMUSCULAR; INTRAVENOUS at 12:38

## 2022-11-07 RX ADMIN — HYDROMORPHONE HYDROCHLORIDE 0.4 MG: 2 INJECTION, SOLUTION INTRAMUSCULAR; INTRAVENOUS; SUBCUTANEOUS at 14:47

## 2022-11-07 RX ADMIN — MIDAZOLAM HYDROCHLORIDE 2 MG: 1 INJECTION, SOLUTION INTRAMUSCULAR; INTRAVENOUS at 12:39

## 2022-11-07 RX ADMIN — DEXAMETHASONE SODIUM PHOSPHATE 4 MG: 4 INJECTION, SOLUTION INTRAMUSCULAR; INTRAVENOUS at 13:12

## 2022-11-07 RX ADMIN — OXYCODONE AND ACETAMINOPHEN 1 TABLET: 5; 325 TABLET ORAL at 16:33

## 2022-11-07 RX ADMIN — Medication 3 G: at 13:06

## 2022-11-07 RX ADMIN — FENTANYL CITRATE 100 MCG: 50 INJECTION INTRAMUSCULAR; INTRAVENOUS at 12:48

## 2022-11-07 RX ADMIN — GLYCOPYRROLATE 0.6 MG: 0.2 INJECTION INTRAMUSCULAR; INTRAVENOUS at 14:37

## 2022-11-07 NOTE — ACP (ADVANCE CARE PLANNING)
Advance Care Planning   Advance Care Planning Inpatient Note  [address] Department    Today's Date: 11/7/2022  Unit: [facility] PREHOLD/AM ADMIT    Received request from patient. Upon review of chart and communication with care team, patient's decision making abilities are not in question. Patient was/were present in the room during visit. Goals of ACP Conversation:  Discuss Advance Care planning documents  Facilitate a discussion related to patient's goals of care as they align with the patient's values and beliefs    Health Care Decision Makers:      Primary Decision Maker: [facility] - [phone]    Summary:  Verified Documents  Completed New Documents    Advance Care Planning Documents (Patient Wishes) on file:  Healthcare Power of /Advance Directive appointment of Health care agent  Living Will/ Advance Directive  Anatomical Gift/Organ donation     Assessment:    Mr. Niesha Lopez was preparing for surgery. He had already completed the form. He confirmed that he and his wife had discussed his decisions together. No spiritual needs were identified at this time. Interventions:  Provided education on documents for clarity and greater understanding  Discussed and provided education on state decision maker hierarchy  Assisted in the completion of documents according to patient's wishes at this time    Care Preferences Communicated:    Hospitalization:  If the patient's health worsens and it becomes clear that the chance of recovery is unlikely,     the patient wants hospitalization. Ventilation:   If the patient, in their present state of health, suddenly became very ill and unable to breathe on their own,     the patient would desire the use of a ventilator (breathing machine). If their health worsens and it becomes clear that the chance of recovery is unlikely,       the patient would desire the use of a ventilator (breathing machine). Resuscitation:   In the event the patient's heart stopped as a result of an underlying serious health condition, the patient communicates a preference for      resuscitative attempts (CPR).     Outcomes/Plan:  New Advance Directive completed  Returned original document(s) to patient, as well as copies for distribution to appointed agents  Copy of Advance Directive given to staff to scan into medical record    [facility] on 11/7/2022 at 11:47 AM

## 2022-11-07 NOTE — ANESTHESIA POSTPROCEDURE EVALUATION
Procedure(s):  REVISION LUMBAR LAMINECTOMY AND DISCECTOMY L4-5.    general    Anesthesia Post Evaluation      Multimodal analgesia: multimodal analgesia used between 6 hours prior to anesthesia start to PACU discharge  Patient location during evaluation: PACU  Level of consciousness: awake  Pain management: adequate  Airway patency: patent  Anesthetic complications: no  Cardiovascular status: acceptable  Respiratory status: acceptable  Hydration status: acceptable  Post anesthesia nausea and vomiting:  none  Final Post Anesthesia Temperature Assessment:  Normothermia (36.0-37.5 degrees C)      INITIAL Post-op Vital signs:   Vitals Value Taken Time   /75 11/07/22 1520   Temp 36.7 °C (98 °F) 11/07/22 1504   Pulse 59 11/07/22 1528   Resp 11 11/07/22 1528   SpO2 94 % 11/07/22 1528   Vitals shown include unvalidated device data.

## 2022-11-07 NOTE — ANESTHESIA PREPROCEDURE EVALUATION
Relevant Problems   No relevant active problems       Anesthetic History   No history of anesthetic complications            Review of Systems / Medical History  Patient summary reviewed, nursing notes reviewed and pertinent labs reviewed    Pulmonary        Sleep apnea    Asthma        Neuro/Psych   Within defined limits           Cardiovascular    Hypertension                   GI/Hepatic/Renal  Within defined limits              Endo/Other  Within defined limits           Other Findings              Physical Exam    Airway  Mallampati: II  TM Distance: > 6 cm  Neck ROM: normal range of motion   Mouth opening: Normal     Cardiovascular  Regular rate and rhythm,  S1 and S2 normal,  no murmur, click, rub, or gallop             Dental  No notable dental hx       Pulmonary  Breath sounds clear to auscultation               Abdominal  GI exam deferred       Other Findings            Anesthetic Plan    ASA: 2  Anesthesia type: general          Induction: Intravenous  Anesthetic plan and risks discussed with: Patient

## 2022-11-07 NOTE — PROGRESS NOTES
Spiritual Care Assessment/Progress Note  [facility] [place]      NAME: Kristie Barron      MRN: [id number]  AGE: 49 y.o. SEX: male  Holiness Affiliation: No Jew   Language: English     11/7/2022     Total Time (in minutes): 10     Spiritual Assessment begun in [facility] PREHOLD/AM ADMIT through conversation with:         [x]Patient        [] Family    [] Friend(s)        Reason for Consult: Advance medical directive consult     Spiritual beliefs: (Please include comment if needed)     [] Identifies with a yuliya tradition:         [] Supported by a yuliya community:            [x] Claims no spiritual orientation:           [] Seeking spiritual identity:                [] Adheres to an individual form of spirituality:           [] Not able to assess:                           Identified resources for coping:      [] Prayer                               [] Music                  [] Guided Imagery     [x] Family/friends                 [] Pet visits     [] Devotional reading                         [] Unknown     [] Other:                                               Interventions offered during this visit: (See comments for more details)    Patient Interventions: Advance medical directive completed           Plan of Care:     [] Support spiritual and/or cultural needs    [] Support AMD and/or advance care planning process      [] Support grieving process   [] Coordinate Rites and/or Rituals    [] Coordination with community clergy   [x] No spiritual needs identified at this time   [] Detailed Plan of Care below (See Comments)  [] Make referral to Music Therapy  [] Make referral to Pet Therapy     [] Make referral to Addiction services  [] Make referral to [facility]  [] Make referral to Spiritual Care Partner  [] No future visits requested        [] Contact Spiritual Care for further referrals     Comments: Assisted patient with completing his advanced medical directive prior to surgery.  He had already completed the form when I arrived. He confirmed that he and his wife had discussed his wishes. He verbalized those details to me as well. No spiritual needs were indicated at this encounter. For additional spiritual care, please contact the  on-call at ([phone]). .  Zaida Bragg MDiv, MS, [facility]  Staff

## 2022-11-07 NOTE — DISCHARGE INSTRUCTIONS
[facility] Dr. Yeimi Goff  [phone]    After [address]:  Discharge Instructions Lumbar Laminectomy Surgery     Patient Name: Dru Archibald    Date of procedure: 11/7/2022  Date of discharge: 11/7/2022    Procedure: Procedure(s):  REVISION LUMBAR LAMINECTOMY AND DISCECTOMY L4-5  PCP: @PCP@    Follow up appointments  -Follow up with Dr. Yeimi Goff in 2 weeks. Call [phone] to make an appointment as soon as you get home from the hospital.    [address]: _________________________   phone: ___________________  The agency will contact you to arrange dates/times for visits. Please call them if you do not hear from them within 24 hours after you are discharged  Physical therapy 3 times a week for 3 weeks  Nursing-initial assessment and as needed    When to call your Orthopaedic Surgeon:  -Signs of infection-if your incision is red; continues to have drainage; drainage has a foul odor or if you have a persistent fever over 101 degrees for 24 hours  -Nausea or vomiting, severe headache  -Loss of bowel or bladder function, inability to urinate  -Changes in sensation in your arms or legs (numbness, tingling, loss of color)  -Increased weakness-greater than before your surgery  -Severe pain or pain not relieved by medications  -Signs of a blood clot in your leg-calf pain, tenderness, redness, swelling of lower leg    When to call your Primary Care Physician:  -Concerns about medical conditions such as diabetes, high blood pressure, asthma, congestive heart failure  -Call if blood sugars are elevated, persistent headache or dizziness, coughing or congestion, constipation or diarrhea, burning with urination, abnormal heart rate    When to call 911 and go to the nearest emergency room:  -Acute onset of chest pain, shortness of breath, difficulty breathing    Activity  - You are going home a well person, be as active as possible. Your only exercise should be walking. Start with short frequent walks and increase your walking distance each day.  -Limit the amount of time you sit to 20-30 minute intervals. Sitting for prolonged periods of time will be uncomfortable for you following surgery.  -Do NOT lift anything over 5 pounds  -Do NOT do any straining, twisting or bending  -When you are in bed, you may lay on your back or on either side. Do NOT lie on your stomach    Brace  -If you have a back brace, you should wear your brace at all times when you are out of bed. Do not wear the brace while in bed or showering.  -Remember to always wear a cotton t-shirt underneath your brace.  -Do not bend or twist when your brace is off    Diet  -Resume usual diet; drink plenty of fluids; eat foods high in fiber  -It is important to have regular bowel movements. Pain medications may cause constipation. You may want to take a stool softener (such as Senokot-S or Colace) to prevent constipation.  -If constipation occurs, take a laxative (such as Dulcolax tablets, Milk of Magnesia, or a suppository). Laxatives should only be used if the above preventable measures have failed and you still have not had a bowel movement after three days. Driving  -You may not drive or return to work until instructed by your physician. However, you may ride in the car for short periods of time. Incision Care  Your incision has been closed with absorbable sutures and steri-strips. This will assist with healing. The steri-strips to remain on your incision for 2 weeks. A dry dressing (ABD and tape) will be placed over it and should be changed daily, for at least the first several days after your surgery. If you have no incisional drainage, you may leave the incision open to air if you wish, still leaving the steri-strips in place. Please make sure to wash your hands prior to touching your dressing. You may take brief showers but do not run the water directly onto the wound.  After your shower, blot your incision dry with a soft towel and replace the dry dressing. Do not allow the tape to come in contact with the steri-strips. Do not rub or apply any lotions or ointments to your incision site. Do not soak or scrub your wound. The steri-strips will be removed during your two week follow-up appointment. If you experience drainage leaking from underneath the steri-strips or if it peels off before 2 weeks, please contact your orthopedic surgeons office. Showering  -You may shower in approximately 4 days after your surgery.    -Leave the dressing on during your shower. Do NOT allow the water to run directly onto your dressing. Once you get out of the shower, gently pat the dressing dry. -Reminder- your brace can be removed while showering. Remember to not bend or twist while your brace is off.    -Do not take a tub bath. Preventing blood clots  -You have been given T.E.D. stockings to wear. Continue to wear these for 7 days after your discharge. Put them on in the morning and take them off at night.    -They are used to increase your circulation and prevent blood clots from forming in your legs  -T. E.D. stockings can be machine washed, temperature not to exceed 160° F (71°C) and machine dried for 15 to 20 minutes, temperature not to exceed 250° F (121°C). Pain management  -Take pain medication as prescribed; decrease the amount you use as your pain lessens  -Do not wait until you are in extreme pain to take your medication.  -Avoid alcoholic beverages while taking pain medication    Pain Medication Safety  DO:  -Read the Medication Guide   -Take your medicine exactly as prescribed   -Store your medicine away from children and in a safe place   -Flush unused medicine down the toilet   -Call your healthcare provider for medical advice about side effects. You may report side effects to FDA at 8-060-FDA-1980.   -Please be aware that many medications contain Tylenol.   We do not want you to over medicate so please read the information below as a guide. Do not take more than 4 Grams of Tylenol in a 24 hour period. (There are 1000 milligrams in one Gram)                                                                                                                                                                                                                           Percocet contains 325 mg of Tylenol per tablet (do not take more than 12 tablets in 24 hours)  Lortab contains 500 mg of Tylenol per tablet (do not take more than 8 tablets in 24 hours)  Norco contains 325 mg of Tylenol per tablet (do not take more than 12 tablets in 24 hours). DO NOT:  -Do not give your medicine to others   -Do not take medicine unless it was prescribed for you   -Do not stop taking your medicine without talking to your healthcare provider   -Do not break, chew, crush, dissolve, or inject your medicine. If you cannot  swallow your medicine whole, talk to your healthcare provider.  -Do not drink alcohol while taking this medicine  -Do not take anti-inflammatory medications or aspirin unless instructed by your physician. **Tylenol given at 12pm.**   ______________________________________________________________________    Anesthesia Discharge Instructions    After general anesthesia or intervenous sedation, for 24 hours or while taking prescription Narcotics:  Limit your activities  Do not drive or operate hazardous machinery  If you have not urinated within 8 hours after discharge, please contact your surgeon on call.   Do not make important personal or business decisions  Do not drink alcoholic beverages    Report the following to your surgeon:  Excessive pain, swelling, redness or odor of or around the surgical area  Temperature over 100.5 degrees  Nausea and vomiting lasting longer than 4 hours or if unable to take medication  Any signs of decreased circulation or nerve impairment to extremity:  Change in color, persistent numbness, tingling, coldness or increased pain.   Any questions

## 2022-11-07 NOTE — BRIEF OP NOTE
Brief Postoperative Note    Patient: Alan Price  YOB: 1970  MRN: [id number]    Date of Procedure: 11/7/2022     Pre-Op Diagnosis: NECK PAIN  SPINAL STENOSIS  CERVICAL STENOSIS    Post-Op Diagnosis: Same as preoperative diagnosis. Procedure(s):  REVISION LUMBAR LAMINECTOMY AND DISCECTOMY L4-5    Surgeon(s):  Jerold Hodgkin, MD    Surgical Assistant: None    Anesthesia: General     Estimated Blood Loss (mL): less than 723     Complications: None    Specimens: * No specimens in log *     Implants:   Implant Name Type Inv.  Item Serial No.  Lot No. LRB No. Used Action   GRAFT HUM TISS 3X4 CM WND COVERING AMNIO [employer] [facility] [id number]  GRAFT HUM TISS 3X4 CM WND COVERING AMNIO [employer] [facility] [id number] [employer] N/A N/A 1 Implanted       Drains: * No LDAs found *    Findings: Stenosis     Electronically Signed by ENRRIQUE Bragg on 11/7/2022 at 2:50 PM

## 2022-11-08 NOTE — H&P
Camille Frey (: 1970) is a 49 y.o. male patient here for evaluation of the following chief complaint(s):  LOW BACK PAIN           ASSESSMENT/PLAN:  Below is the assessment and plan developed based on review of pertinent history, physical exam, labs, studies, and medications. 1. Chronic bilateral low back pain with left-sided sciatica  2. Neck pain  3. Status post cervical spinal fusion  4. Spinal stenosis of lumbar region without neurogenic claudication  5. Cervical disc disease        The patient's radiologic findings have been reviewed with him in detail today. He has a long standing history of chronic low back pain with residual right lower extremity weakness and residual left lower extremity radicular pain. He is status post laminectomy from 2019 and subsequent dorsal column stimulator implantation from . Unfortunately despite his dorsal column stimulator and Lyrica, he has had continued and severe left lower extremity radiculopathy. He is having persistent symptoms. CT myelogram shows large disc protrusion at L4-5 with cephalad migration causing severe central and left lateral recess stenosis. I think is a candidate for a revision laminectomy for decompression with bilateral decompression at L4-5. I think we can avoid a fusion at this time. In terms of his neck he does have a right-sided protrusion at C7-T1 as well as moderate central stenosis C6-7 below his previous ACDF. Obviously would like to avoid further extension of his cervical fusion given that he most likely need a posterior approach. I am going to send him for some right-sided C6-7 and C7-T1 injections while we proceed with his lower back. Risk and benefits were discussed with him. Procedure: Revision lumbar laminectomy and discectomy L4-5     Date of Surgery Update:  Camille Frey was seen and examined. History and physical has been reviewed. The patient has been examined.  There have been no significant clinical changes since the completion of the originally dated History and Physical.     Signed By: Ирина Baltazar MD      2022 7:22 AM             The risks and benefits were discussed at length with the patient and the patient has elected to proceed. Indications for surgery include failed conservative treatment. Alternative treatments, risks and the perioperative course were discussed with the patient. All questions were answered. The risks and benefits of the procedure were explained. Benefits include definitive diagnosis, relief of pain, elimination of deformity and improved function. Risks of surgery including bleeding, infection, weakness, numbness, CSF leak, failure to improve symptoms, exacerbation of medical co-morbidities and even death were discussed with the patient. No follow-ups on file. SUBJECTIVE/OBJECTIVE:  Yelena Medley (: 1970) is a 49 y.o. male who presents today for the following:        Chief Complaint   Patient presents with    LOW BACK PAIN         He comes in today for follow-up. Since we last saw him he had an MRI of his cervical spine as well as a CT myelogram of his lumbar spine. He has persistent bilateral leg symptoms. It is intermittently painful in the right and left lower extremities. Initially was worse on the left side. The back pain is worse with activities. He denies any bowel bladder difficulties. He does have some unsteadiness of his gait. Back Pain   Associated symptoms include numbness and weakness. Neck Pain     LOW BACK PAIN     Mr. Pradeep Eduardo as a new patient to the practice. He is status post prior C5-6 ACDF from  per Dr. Marli Tang. Status post lumbar laminectomy from  per Dr. Marli Tang as well. He had cord compression which prompted his ACDF with an acute HNP which prompted his lumbar ectomy.   He states that he has had residual right leg weakness and persistent left lower extremity radiculopathy after his laminectomy. Ultimately due to his persistent pain, he had a dorsal column stimulator implanted by Priy[facility] Mccauley in 2020. He continues with constant low back pain with left anterolateral thigh pain to the knee despite his stimulator. He is currently on meloxicam and Lyrica per his PCP. He states that his stimulator has not been helping in relieving his symptoms enough, however this remains unchanged. No numbness or tingling in the legs. He does report chronic right foot weakness. No bowel or bladder changes. He has also been seen by [address] [place] [address] last year and had MRI of the lumbar spine from nearly 1 year ago. Over the past several months, he has had difficulty with increasing balance issues and falls. He has had 2 falls over the past several months. He has had increasing posterior neck pain with radiation of the mid scapular region. No specific arm pain, but he does report numbness and tingling in his hands bilaterally. He has had increasing weakness in his right hand over the past month and notices difficulty with dexterity in the right hand and atrophy in the right forearm. IMAGING:  XR Results (most recent):  Results from Hospital Encounter encounter on 10/06/22     XR MYELO LUMBAR     Narrative  PROCEDURE:  FLUOROSCOPIC GUIDED LUMBAR SPINE MYELOGRAM     HISTORY: Merly Cornejo is a 49years old Male with Chronic bilateral low  back pain with left-sided sciatica . :  Jovana Murphy NP; Connie Choi NP     ATTENDING:  Dr. Landrum Market:  After full discussion of the procedure, including risks, benefits and  alternatives, both verbal and written consent were obtained. TECHNIQUE: A timeout was called to verify the correct patient, procedure, site  and allergies. The patient was placed prone on the fluoroscopy table. Initial  images  were obtained. An appropriate site for myelogram was marked at the L5-S1 level.   The skin was prepped and draped in sterile fashion. 1% lidocaine was utilized  for local anesthesia. A 22 gauge spinal needle was then inserted under  fluoroscopic guidance into the thecal sac. There was prompt return of  cerebrospinal fluid through the needle. Approximately 10 ml of Omnipaque 180  contrast was administered intrathecally under spot fluoroscopic guidance. The  needle was removed. Pressure was applied locally at the puncture site. A dry  sterile dressing was applied. There were no immediate complications. The  patient tolerated the procedure without difficulty. Additional fluoroscopic images were then obtained in multiple obliquities. COMPLICATIONS:  None     ESTIMATED BLOOD LOSS:  < 1 ml     AIR KERMA:  107.85 mGy     Impression  Technically successful fluoroscopic guided lumbar spine myelogram.  Separately  dictated CT myelogram report to follow. XR SPINE LUMBAR  4 views of the lumbar spine were reviewed today. Previous laminectomy noted. Spinal cord stimulator placement with percutaneous leads noted. No gross instability with flexion-extension. No fractures or lytic lesion. MRI Results (most recent): 10/2021     MRI Results (most recent):  Results from [place] encounter on 10/05/22     MRI CERV SPINE WO CONT     Narrative  EXAM: MRI CERV SPINE WO CONT  Clinical history: Cervical radiculopathy  INDICATION: . Cervicalgia     COMPARISON: None     TECHNIQUE: MR imaging of the cervical spine was performed using the following  sequences: sagittal T1, T2, STIR;  axial T2, T1.     CONTRAST:  None. FINDINGS:     There is congenital narrowing of the cervical canal. There is no Chiari or  syrinx. There is no fracture or dislocation. Vertebral body heights are  maintained. Trace cord myelomalacia is likely chronic in nature and related to  chronic degenerative changes at C5-6. The craniocervical junction is intact. Status post ACDF at C5-6 with partial  osseous fusion.      The paraspinal soft tissues are within normal limits. C2-C3: Disc bulge. Mild facet arthropathy. Canal is patent. Foramina are patent. C3-C4: Mild facet arthropathy. Mild central protrusion. Mild canal stenosis. Mild right foraminal stenosis. C4-C5: Mild facet arthropathy. Circumferential bulge/osteophyte. Mild canal  stenosis. Foramina are patent. C5-C6: Status post ACDF. Facet arthropathy is greater on the left. Canal  stenosis is mild. Moderate left foraminal stenosis. C6-C7: Disc desiccation loss of disc height. Circumferential  protrusion/osteophyte. Mild facet arthropathy. Moderate canal stenosis. Severe  left foraminal stenosis. Moderate right foraminal stenosis. C7-T1: Disc desiccation. Lobulated protrusion/osteophyte. Canal is patent. Moderate to severe right and mild left foraminal stenosis. Impression  Multilevel disc and facet degenerative change with congenital narrowing of the  cervical canal.     There is moderate canal, severe left and moderate right foraminal stenosis at  C6-7. Moderate to severe right and mild left foraminal stenosis at C7-T1. Status post ACDF at C5-6 with moderate left foraminal stenosis at C5-6. No  unusual postprocedural findings at C5-6. CT Results (most recent):  Results from Hospital Encounter encounter on 10/06/22     CT SPINE LUMB W CONT     Narrative  EXAM:  CT SPINE LUMB W CONT     INDICATION:  Lumbago and sciatica, left side     COMPARISON: Fluoroscopy-guided lumbar myelogram 10/6/2022, lumbosacral spine  radiographs 9/15/2022, MRI lumbar spine 10/4/2021     TECHNIQUE:  Following the lumbar myelogram thin spiral axial images were obtained from  approximately T12 down through the sacrum. Sagittal and coronal reconstructions  were created. CT dose reduction was achieved through use of a standardized  protocol tailored for this examination and automatic exposure control for dose  modulation.      FINDINGS:     Alignment is within normal limits. There is no fracture or compression  deformity. Lower spinal cord is normal in appearance. Conus terminates at L1-L2. Spinal stimulator. Partially visualized 2.7 cm right adrenal adenoma; no  dedicated follow-up recommended. Atherosclerosis. Lower thoracic spine: No herniation or stenosis. L1-L2: Mild facet arthropathy. No stenosis. L2-L3: Moderate facet arthropathy. No stenosis. L3-L4: Disc bulge with central annular fissure. Mild facet arthropathy. Ligament  flavum thickening. Mild spinal stenosis. Mild left foraminal stenosis. L4-L5: Inferiorly extending left subarticular disc extrusion impinging the  descending left L5 and S1 nerve roots. Moderate facet arthropathy. Moderate  spinal stenosis. Moderate left and mild right foraminal stenosis. L5-S1: Disc bulge. Endplate osteophytes. Moderate facet arthropathy. No spinal  stenosis. Mild left foraminal stenosis. Impression  1. Study note: Patient underwent a lumbar spine MRI on 10/4/2021 with the  spinal stimulator in place. If the patient did not experience any issues with  that exam, consider future evaluation with MRI to potentially avoid another  myelography procedure. 2. L4-L5 inferiorly extending left subarticular disc extrusion impinging the  descending left L5 and S1 nerve roots. Moderate spinal canal stenosis. Moderate  left neuroforaminal stenosis, and mild right neuroforaminal stenosis at this  level as well. 3.  L3-L4 mild spinal canal and mild left foraminal stenosis. 4. L5-S1 mild left foraminal stenosis. 5.  Incidental right adrenal adenoma.          No Known Allergies             Current Outpatient Medications   Medication Sig    amLODIPine (NORVASC) 5 mg tablet      atorvastatin (LIPITOR) 20 mg tablet      carvediloL (COREG) 25 mg tablet      DULoxetine (CYMBALTA) 30 mg capsule      furosemide (LASIX) 40 mg tablet      hydroCHLOROthiazide (HYDRODIURIL) 25 mg tablet      lisinopriL (PRINIVIL, ZESTRIL) 10 mg tablet      meloxicam (MOBIC) 15 mg tablet Take 15 mg by mouth daily. pregabalin (LYRICA) 100 mg capsule      fluticasone propion-salmeteroL (ADVAIR/WIXELA) 100-50 mcg/dose diskus inhaler Take 1 Puff by inhalation every twelve (12) hours. No current facility-administered medications for this visit. History reviewed. No pertinent past medical history. History reviewed. No pertinent surgical history. History reviewed. No pertinent family history. Social History              Tobacco Use    Smoking status: Never    Smokeless tobacco: Never   Substance Use Topics    Alcohol use: Not on file         Review of Systems   Constitutional: Negative. Respiratory: Negative. Cardiovascular: Negative. Gastrointestinal: Negative. Endocrine: Negative. Genitourinary: Negative. Musculoskeletal:  Positive for back pain, gait problem, neck pain and neck stiffness. Skin: Negative. Allergic/Immunologic: Negative. Neurological:  Positive for weakness and numbness. Hematological: Negative. Psychiatric/Behavioral: Negative. All other systems reviewed and are negative. Pain Assessment  10/25/2022   Location of Pain Back   Location Modifiers Posterior   Severity of Pain 7             Vitals:  Ht 6' 3\" (1.905 m)   Wt 280 lb (127 kg)   BMI 35.00 kg/m²    Body mass index is 35 kg/m². Physical Exam     Neurologic  Sensory  Light Touch - Intact - Globally. Overall Assessment of Muscle Strength and Tone reveals  Upper Extremities - Right Deltoid - 5/5. Left Deltoid - 5/5. Right Bicep - 5/5. Left Bicep - 5/5. Right Tricep - 5/5. Left Tricep - 5/5. Right Wrist Extensors - 5/5. Left Wrist Extensors - 5/5. Right Wrist Flexors - 5/5. Left Wrist Flexors - 5/5. Right Intrinsics - 3/5. Left Intrinsics - 5/5. Lower Extremities - Right Iliopsoas - 5/5. Left Iliopsoas - 5/5. Right Tibialis Anterior - 5/5. Left Tibialis Anterior - 5/5. Right Gastroc-Soleus - 5/5.  Left Gastroc-Soleus - 5/5. Right EHL - 4/5. Left EHL - 5/5. Right medial forearm atrophy noted. General Assessment of Reflexes  Right Hand - Mcintosh's sign is positive in the right hand. Left Hand - Mcintosh's sign is positive in the left hand. Right Ankle - Clonus is not present. Left Ankle - Clonus is not present. Reflexes (Dermatomes)  2/2 Normal - Left Bicep (C5-6), Left Tricep (C7-8), Left Brachioradialis (C5-6), Right Bicep (C5-6), Right Tricep (C7-8) and Right Brachioradialis (C5-6). Left Achilles (L5-S2), Left Knee (L2-4), Right Achilles (L5-S2) and Right Knee (L2-4). Musculoskeletal  Global Assessment  Examination of related systems reveals - well-developed, well-nourished, in no acute distress, alert and oriented x 3 and normal coordination. Gait and Station - normal gait and station and normal posture. Spine/Ribs/Pelvis  Cervical Spine - Evaluation of related systems reveals - no lymphadenopathy and neurovascularly intact bilaterally. Inspection and Palpation - Tenderness - moderate and localized. Assessment of pain reveals the following findings: - Location - cervical area. ROJM - Flexion - 85 °. Right Lateral Flexion - 35 °. Left Lateral Flexion - 35 °. Extension - 70 °. Right Rotation - 80 °. Left Rotation - 80 °. Cervical Spine - Functional Testing - Foraminal Compression/Spurling's Test negative, Shoulder Depression Test negative, Upper Limb Tension Test negative. Thoracic (Dorsal) Spine - Examination of the thoracic spine reveals - no tenderness over thoracic vertebrae, no pain, normal sensation and normal thoracic spine movements. Lumbosacral Spine - Examination of the lumbosacral spine reveals - no known fractures or deformities. Inspection and Palpation - Tenderness - moderate. Assessment of pain reveals the following findings - The pain is characterized as - moderate. Location - pain refers to lower back bilaterally. ROJM - Trunk Extension - 15 degrees. Lumbar Spine Flexion - 35 °. Lumbosacral Spine - Functional Testing - Babinski Test negative, Prone Knee Bending Test negative, Slump Test negative, Straight Leg Raising Test negative. An electronic signature was used to authenticate this note.   -- Jaelyn Jones MD

## 2022-11-08 NOTE — OP NOTES
[address]   OPERATIVE REPORT    Name:  Abdiel Murdock  MR#:  [id number]  :  1970  ACCOUNT #:  [de-identified]  DATE OF SERVICE:  2022    PREOPERATIVE DIAGNOSES:  1. Lumbar spondylosis, L4-L5. 2.  Lumbar stenosis, L4-L5. 3.  Lumbar disk herniation, L4-L5. POSTOPERATIVE DIAGNOSES:  1. Lumbar spondylosis, L4-L5. 2.  Lumbar stenosis, L4-L5. 3.  Lumbar disk herniation, L4-L5. PROCEDURES PERFORMED:  1. Revision lumbar laminectomy, L4-L5. 2.  Lumbar diskectomy. 3.  Bilateral decompression of L4-L5 nerve roots. SURGEON:  Roselia Alan MD    ASSISTANT:  Caroline Johnson PA-C    ANESTHESIA:  General.    COMPLICATIONS:  None. SPECIMENS REMOVED:  None. IMPLANTS:  None. ESTIMATED BLOOD LOSS:  Minimal.    INDICATIONS:  This is a 80-year-old gentleman with chronic back pain and bilateral leg pain, left greater than right. Previous lumbar laminectomy at L4-L5, now with chronic back pain and intermittent left and right leg pain. Large disk protrusion at left L4-L5 causing central stenosis. The patient failed conservative treatment, understood the risks and benefits, elected to proceed. PROCEDURE:  The patient was identified, brought to the operative suite, underwent general anesthesia without difficulty. Placed in the prone position on the open Osiel frame with all bony prominences well padded. Back was prepped and draped sterilely. Time-out confirmed. Incision was made in the midline using previous surgical incision. Dissection was carried down with careful exposure of the laminotomy site particularly on the left hand side. We exposed the facet joints bilaterally. X-ray was taken to confirm the appropriate level. We then carefully started the wide laminectomy. We undercut the L4 lamina, carried this out proximally to release the ligamentum.   On the right hand side where there was little bit of scar, we removed the ligamentum, decompressed laterally with partial facet resection for decompression of the transversing L5 nerve and undercut the superior articular process of L4 for decompression of the L4 nerve root and the foramen. Continued this around circumferentially to the left L4-L5 region. Severe scarring noted in the facet region. Hypertrophic facets, ligament were noted. We carefully dissected down the scar tissue, release the ligamentum for decompression of the lateral recess. The lateral aspect of the nerve root was identified, carefully dissected this off. Large posterolateral disk protrusion noted. Annulotomy was performed, diskectomy was performed for decompression further of the nerve root. Hemostasis with bipolar cautery and FloSeal.  VersaShield for anti-adhesion. We irrigated with wound, followed by placement of #1 Stratafix on the fascial layer,  2-0 Stratafix on the subcutaneous layer, and 3-0 Stratafix on the skin. Pain solution injected. The patient returned to the PACU in stable condition. The physician assistant was present during the entire operative procedure and assisted in all critical elements of the surgery. No surgical assist or resident was available.       Meet Smith MD      [id number]  D:  11/07/2022 14:35  T:  11/08/2022 7:18  JOB #:  [id number]

## 2022-11-14 ENCOUNTER — TELEPHONE (OUTPATIENT)
Dept: ORTHOPEDIC SURGERY | Age: 52
End: 2022-11-14

## 2022-11-14 NOTE — TELEPHONE ENCOUNTER
Patient is going to be 3 weeks from a 1. Revision lumbar laminectomy, L4-L5. 2.  Lumbar diskectomy. 3.  Bilateral decompression of L4-L5 nerve roots.  Is he able to have a colonoscope three weeks out

## 2022-11-14 NOTE — OP NOTES
[address]   OPERATIVE REPORT    Name:  Evie Orellana  MR#:  [id number]  :  1970  ACCOUNT #:  [de-identified]  DATE OF SERVICE:  2022    ADDENDUM    Nicolasa Martinez PA-C, was present for the entirety of the procedure and vital for the performance of the procedure. Nicolasa Martinez PA-C, assisted with positioning, prepping, and draping of the patient before the procedure as well as instrument manipulation. She was crucial in assisting with the lumbar decompression component of the procedure. Nicolasa Martinez PA-C, was also involved in wound closure, dressing application, and brace application after the procedure. Please note no intern, resident, or other hospital staff was available to assist during the surgery.         Rosa Marcos MD      [id number]  D:  2022 10:57  T:  2022 18:18  JOB #:  [id number]

## 2022-11-16 NOTE — OP NOTES
[address] [facility]  OPERATIVE REPORT    Name:  Hector Sethi  MR#:  [id number]  :  1970  ACCOUNT #:  [de-identified]  DATE OF SERVICE:  2022      ADDENDUM    The surgical assistant was ENRRIQUE Sepulveda-Garrett Fair MD      [id number]  D:  11/15/2022 13:33  T:  11/15/2022 23:01  JOB #:  [id number]

## 2022-11-21 ENCOUNTER — OFFICE VISIT (OUTPATIENT)
Dept: ORTHOPEDIC SURGERY | Age: 52
End: 2022-11-21
Payer: COMMERCIAL

## 2022-11-21 VITALS — WEIGHT: 276 LBS | BODY MASS INDEX: 34.32 KG/M2 | HEIGHT: 75 IN

## 2022-11-21 DIAGNOSIS — M48.02 CERVICAL STENOSIS OF SPINE: ICD-10-CM

## 2022-11-21 DIAGNOSIS — Z98.1 STATUS POST CERVICAL SPINAL FUSION: ICD-10-CM

## 2022-11-21 DIAGNOSIS — M54.2 NECK PAIN: ICD-10-CM

## 2022-11-21 DIAGNOSIS — Z98.890 S/P LUMBAR LAMINECTOMY: Primary | ICD-10-CM

## 2022-11-21 PROCEDURE — 99024 POSTOP FOLLOW-UP VISIT: CPT | Performed by: PHYSICIAN ASSISTANT

## 2022-11-21 NOTE — PROGRESS NOTES
Edward Goyal (: 1970) is a 47 y.o. male patient here for evaluation of the following chief complaint(s):  Surgical Follow-up (Sx 22 L4/5 Laminectomy, Discectomy (PO#1))         ASSESSMENT/PLAN:  Below is the assessment and plan developed based on review of pertinent history, physical exam, labs, studies, and medications. 1. S/P lumbar laminectomy  -     XR SPINE LUMB 2 OR 3 V; Future  2. Neck pain  -     REFERRAL TO SPINE INJECTION; Future  3. Status post cervical spinal fusion  -     REFERRAL TO SPINE INJECTION; Future  4. Cervical stenosis of spine  -     REFERRAL TO SPINE INJECTION; Future      80-year-old male 2 weeks status post L4-5 revision laminectomy and discectomy. I am happy his progress so far following this procedure. His x-rays were reviewed with him in detail today and his questions were answered to his satisfaction. His incision appears clean, dry, and intact. We reviewed continuing to limit bending lifting and twisting activities. He is not currently wearing a brace. He can take over-the-counter NSAIDs or Tylenol for any residual symptoms. In terms of his neck he does have a right-sided protrusion at C7-T1 as well as moderate central stenosis C6-7 below his previous ACDF. Obviously would like to avoid further extension of his cervical fusion given that he most likely need a posterior approach. I am going to send him for some right-sided C6-7 and C7-T1 injections while we proceed with his lower back. Risk and benefits were discussed with him. He will follow-up with us for a routine postop visit in 4 weeks. Return in about 4 weeks (around 2022).       SUBJECTIVE/OBJECTIVE:  Edward Goyal (: 1970) is a 47 y.o. male who presents today for the following:  Chief Complaint   Patient presents with    Surgical Follow-up     Sx 22 L4/5 Laminectomy, Discectomy (PO#1)        HPI   80-year-old male presenting 2 weeks status post revision L4-L5 laminectomy discectomy. He states he is overall doing well following this procedure. He states his pain has been tolerable. He does admit to some mild continued pain in his left leg and some residual weakness in his right leg that has been chronic. He has not been needing to take any medication for the symptoms. He he denies any fever, chills, wound dehiscence or drainage. Over the past several months, he has had difficulty with increasing balance issues and falls. He has had 2 falls over the past several months. He has had increasing posterior neck pain with radiation of the mid scapular region. No specific arm pain, but he does report numbness and tingling in his hands bilaterally. He has had increasing weakness in his right hand over the past month and notices difficulty with dexterity in the right hand and atrophy in the right forearm. IMAGING:  XR Results (most recent):  Results from Appointment encounter on 11/21/22    XR SPINE LUMB 2 OR 3 V    Narrative  InAP, lateral films of the lumbar spine reveal no evidence of acute fracture or lytic lesion. Stable L4-5 laminectomy. Worsening. No acute changes. MRI Results (most recent):  Results from [place] encounter on 10/05/22    MRI CERV SPINE WO CONT    Narrative  EXAM: MRI CERV SPINE WO CONT  Clinical history: Cervical radiculopathy  INDICATION: . Cervicalgia    COMPARISON: None    TECHNIQUE: MR imaging of the cervical spine was performed using the following  sequences: sagittal T1, T2, STIR;  axial T2, T1.    CONTRAST:  None. FINDINGS:    There is congenital narrowing of the cervical canal. There is no Chiari or  syrinx. There is no fracture or dislocation. Vertebral body heights are  maintained. Trace cord myelomalacia is likely chronic in nature and related to  chronic degenerative changes at C5-6. The craniocervical junction is intact. Status post ACDF at C5-6 with partial  osseous fusion.     The paraspinal soft tissues are within normal limits. C2-C3: Disc bulge. Mild facet arthropathy. Canal is patent. Foramina are patent. C3-C4: Mild facet arthropathy. Mild central protrusion. Mild canal stenosis. Mild right foraminal stenosis. C4-C5: Mild facet arthropathy. Circumferential bulge/osteophyte. Mild canal  stenosis. Foramina are patent. C5-C6: Status post ACDF. Facet arthropathy is greater on the left. Canal  stenosis is mild. Moderate left foraminal stenosis. C6-C7: Disc desiccation loss of disc height. Circumferential  protrusion/osteophyte. Mild facet arthropathy. Moderate canal stenosis. Severe  left foraminal stenosis. Moderate right foraminal stenosis. C7-T1: Disc desiccation. Lobulated protrusion/osteophyte. Canal is patent. Moderate to severe right and mild left foraminal stenosis. Impression  Multilevel disc and facet degenerative change with congenital narrowing of the  cervical canal.    There is moderate canal, severe left and moderate right foraminal stenosis at  C6-7. Moderate to severe right and mild left foraminal stenosis at C7-T1. Status post ACDF at C5-6 with moderate left foraminal stenosis at C5-6. No  unusual postprocedural findings at C5-6. No Known Allergies    Current Outpatient Medications   Medication Sig    naloxone (Narcan) 4 mg/actuation nasal spray Use 1 spray intranasally, then discard. Repeat with new spray every 2 min as needed for opioid overdose symptoms, alternating nostrils. mometasone (NASONEX) 50 mcg/actuation nasal spray 2 Sprays daily. amLODIPine (NORVASC) 5 mg tablet nightly. atorvastatin (LIPITOR) 20 mg tablet nightly. carvediloL (COREG) 25 mg tablet two (2) times a day. DULoxetine (CYMBALTA) 30 mg capsule 30 mg two (2) times a day. hydroCHLOROthiazide (HYDRODIURIL) 25 mg tablet daily. lisinopriL (PRINIVIL, ZESTRIL) 10 mg tablet daily. meloxicam (MOBIC) 15 mg tablet Take 15 mg by mouth daily.     pregabalin (LYRICA) 100 mg capsule two (2) times a day. fluticasone propion-salmeteroL (ADVAIR/WIXELA) 100-50 mcg/dose diskus inhaler Take 1 Puff by inhalation every twelve (12) hours. No current facility-administered medications for this visit. Past Medical History:   Diagnosis Date    Asthma     Hypertension     Sleep apnea         Past Surgical History:   Procedure Laterality Date    HX BACK SURGERY  04/15/2019    CERVICAL FUSION    HX BACK SURGERY  06/14/2019    LAMINECTOMY    HX BACK SURGERY  11/10/2020    SPINAL STIMULATOR    HX VASECTOMY         Family History   Problem Relation Age of Onset    No Known Problems Mother     No Known Problems Father     No Known Problems Brother     Anesth Problems Neg Hx         Social History     Tobacco Use    Smoking status: Never    Smokeless tobacco: Former     Quit date: 8/21/1999   Substance Use Topics    Alcohol use: Yes     Alcohol/week: 21.0 standard drinks     Types: 21 Shots of liquor per week        Review of Systems     Pain Assessment  10/25/2022   Location of Pain Back   Location Modifiers Posterior   Severity of Pain 7           Vitals:  Ht 6' 3\" (1.905 m)   Wt 276 lb (125.2 kg)   BMI 34.50 kg/m²    Body mass index is 34.5 kg/m². Physical Exam    Neurologic  Sensory  Light Touch - Intact - Globally. Overall Assessment of Muscle Strength and Tone reveals  Upper Extremities - Right Deltoid - 5/5. Left Deltoid - 5/5. Right Bicep - 5/5. Left Bicep - 5/5. Right Tricep - 5/5. Left Tricep - 5/5. Right Wrist Extensors - 5/5. Left Wrist Extensors - 5/5. Right Wrist Flexors - 5/5. Left Wrist Flexors - 5/5. Right Intrinsics - 3/5. Left Intrinsics - 5/5. Lower Extremities - Right Iliopsoas - 5/5. Left Iliopsoas - 5/5. Right Tibialis Anterior - 5/5. Left Tibialis Anterior - 5/5. Right Gastroc-Soleus - 5/5. Left Gastroc-Soleus - 5/5. Right EHL - 4/5. Left EHL - 5/5. Right medial forearm atrophy noted.   General Assessment of Reflexes  Right Hand - Mcintosh's sign is positive in the right hand. Left Hand - Mcintosh's sign is positive in the left hand. Right Ankle - Clonus is not present. Left Ankle - Clonus is not present. Reflexes (Dermatomes)  2/2 Normal - Left Bicep (C5-6), Left Tricep (C7-8), Left Brachioradialis (C5-6), Right Bicep (C5-6), Right Tricep (C7-8) and Right Brachioradialis (C5-6). Left Achilles (L5-S2), Left Knee (L2-4), Right Achilles (L5-S2) and Right Knee (L2-4). Musculoskeletal  Global Assessment  Examination of related systems reveals - well-developed, well-nourished, in no acute distress, alert and oriented x 3 and normal coordination. Gait and Station - normal gait and station and normal posture. Spine/Ribs/Pelvis  Cervical Spine - Evaluation of related systems reveals - no lymphadenopathy and neurovascularly intact bilaterally. Inspection and Palpation - Tenderness - moderate and localized. Assessment of pain reveals the following findings: - Location - cervical area. ROJM - Flexion - 85 °. Right Lateral Flexion - 35 °. Left Lateral Flexion - 35 °. Extension - 70 °. Right Rotation - 80 °. Left Rotation - 80 °. Cervical Spine - Functional Testing - Foraminal Compression/Spurling's Test negative, Shoulder Depression Test negative, Upper Limb Tension Test negative. Thoracic (Dorsal) Spine - Examination of the thoracic spine reveals - no tenderness over thoracic vertebrae, no pain, normal sensation and normal thoracic spine movements. Lumbosacral Spine - Examination of the lumbosacral spine reveals - no known fractures or deformities. Inspection and Palpation - Tenderness - moderate. Assessment of pain reveals the following findings - The pain is characterized as - moderate. Location - pain refers to lower back bilaterally. ROJM - Trunk Extension - 15 degrees. Lumbar Spine Flexion - 35 °. Lumbosacral Spine - Functional Testing - Babinski Test negative, Prone Knee Bending Test negative, Slump Test negative, Straight Leg Raising Test negative.        Patricia was available for immediate consult during this encounter. An electronic signature was used to authenticate this note.   -- Edgar Villalba PA-C

## 2022-11-21 NOTE — PROGRESS NOTES
1. Have you been to the ER, urgent care clinic since your last visit? Hospitalized since your last visit? No    2. Have you seen or consulted any other health care providers outside of the [address] [place] [address] since your last visit? Include any pap smears or colon screening.  No    Chief Complaint   Patient presents with    Surgical Follow-up     Sx 11/07/22 L4/5 Laminectomy, Discectomy (PO#1)

## 2022-12-14 ENCOUNTER — TELEPHONE (OUTPATIENT)
Dept: ORTHOPEDIC SURGERY | Age: 52
End: 2022-12-14

## 2022-12-20 ENCOUNTER — OFFICE VISIT (OUTPATIENT)
Dept: ORTHOPEDIC SURGERY | Age: 52
End: 2022-12-20
Payer: COMMERCIAL

## 2022-12-20 VITALS — BODY MASS INDEX: 34.32 KG/M2 | WEIGHT: 276 LBS | HEIGHT: 75 IN

## 2022-12-20 DIAGNOSIS — M54.42 CHRONIC BILATERAL LOW BACK PAIN WITH LEFT-SIDED SCIATICA: ICD-10-CM

## 2022-12-20 DIAGNOSIS — Z98.1 S/P CERVICAL SPINAL FUSION: ICD-10-CM

## 2022-12-20 DIAGNOSIS — G89.29 CHRONIC BILATERAL LOW BACK PAIN WITH LEFT-SIDED SCIATICA: ICD-10-CM

## 2022-12-20 DIAGNOSIS — M48.02 CERVICAL STENOSIS OF SPINE: Primary | ICD-10-CM

## 2022-12-20 NOTE — PROGRESS NOTES
1. Have you been to the ER, urgent care clinic since your last visit? Hospitalized since your last visit? No    2. Have you seen or consulted any other health care providers outside of the [address] [place] [address] since your last visit? Include any pap smears or colon screening.  No    Chief Complaint   Patient presents with    Surgical Follow-up     Sx 11/07/22 Revision Laminectomy/Discectomy L4/5 (PO#2)

## 2022-12-20 NOTE — LETTER
12/20/2022    Patient: Malachi Feliciano   YOB: 1970   Date of Visit: 12/20/2022     Carla Martinez MD  [id number] [facility] [address]  Via Fax: [phone]    Dear Carla Martinez MD,      Thank you for referring Mr. Malachi Feliciano to [facility] for evaluation. My notes for this consultation are attached. If you have questions, please do not hesitate to call me. I look forward to following your patient along with you.       Sincerely,    Flakita Mendez MD

## 2022-12-20 NOTE — PATIENT INSTRUCTIONS
Cervical Spinal Fusion: Before Your Surgery  What is cervical spinal fusion? Cervical spinal fusion is surgery that joins two or more of the vertebrae in your neck. When these bones are joined together, it's called fusion. After the joints are fused, they can no longer move. During the surgery, the doctor uses bone to make a \"bridge\" between your vertebrae. This bridge may be strengthened with metal plates and screws. In most cases, the doctor uses bone from another part of your body or bone that has been donated to a bone bank. But sometimes human-made bone is used. To do the surgery, the doctor makes a cut in either the front or the back of your neck. The cut is called an incision. It leaves a scar that fades with time. After surgery, you will have a short hospital stay. Your neck will feel stiff or sore. You will get medicine to help with pain. Most people can go back to work after 4 to 6 weeks. But it may take a few months to get back to your usual activities. How do you prepare for surgery? Surgery can be stressful. This information will help you understand what you can expect. And it will help you safely prepare for surgery. Preparing for surgery    Be sure you have someone to take you home. Anesthesia and pain medicine will make it unsafe for you to drive or get home on your own. Understand exactly what surgery is planned, along with the risks, benefits, and other options. If you take a medicine that prevents blood clots, your doctor may tell you to stop taking it before your surgery. Or your doctor may tell you to keep taking it. (These medicines include aspirin and other blood thinners.) Make sure that you understand exactly what your doctor wants you to do. Tell your doctor ALL the medicines, vitamins, supplements, and herbal remedies you take. Some may increase the risk of problems during your surgery.  Your doctor will tell you if you should stop taking any of them before the surgery and how soon to do it. Make sure your doctor and the hospital have a copy of your advance directive. If you don't have one, you may want to prepare one. It lets others know your health care wishes. It's a good thing to have before any type of surgery or procedure. What happens on the day of surgery? Follow the instructions exactly about when to stop eating and drinking. If you don't, your surgery may be canceled. If your doctor told you to take your medicines on the day of surgery, take them with only a sip of water. Take a bath or shower before you come in for your surgery. Do not apply lotions, perfumes, deodorants, or nail polish. Do not shave the surgical site yourself. Take off all jewelry and piercings. And take out contact lenses, if you wear them. At the hospital or surgery center   Bring a picture ID. The area for surgery is often marked to make sure there are no errors. You will be kept comfortable and safe by your anesthesia provider. You will be asleep during the surgery. The surgery usually takes 2 to 4 hours. If more than two vertebrae are being fused together, it will take longer. When you wake up, you will be lying on your back. You will have a soft or hard collar around your neck. This will protect and support your neck. It will also keep you from turning your head. You may have a small plastic tube coming out of your incision. This is to drain fluids. It's usually taken out in 1 or 2 days. When should you call your doctor? You have questions or concerns. You do not understand how to prepare for your surgery. You become ill before surgery (such as fever, flu, or a cold). You need to reschedule or have changed your mind about having the surgery. Where can you learn more? Go to http://www.gray.com/  Enter N543 in the search box to learn more about \"Cervical Spinal Fusion: Before Your Surgery. \"  Current as of: March 9, 2022               Content Version: 13.4  © 5517-1794 [employer]wise, [facility]. Care instructions adapted under license by [employer] (which disclaims liability or warranty for this information). If you have questions about a medical condition or this instruction, always ask your healthcare professional. [place] [address] any warranty or liability for your use of this information.

## 2022-12-20 NOTE — PROGRESS NOTES
Jennifer Winston (: 1970) is a 47 y.o. male, patient, here for evaluation of the following chief complaint(s):  Surgical Follow-up (Sx 22 Revision Laminectomy/Discectomy L4/5 (PO#2))       ASSESSMENT/PLAN:    Below is the assessment and plan developed based on review of pertinent history, physical exam, labs, studies, and medications. In terms of his lower back he is improving slowly but steadily. He has lower back pain and some occasional left leg symptoms still. His main complaint is his neck at this time. He has known spondylosis C6-7 below his previous ACDF. He had 1 round of injections without significant relief. We had a lengthy discussion options. I think is a candidate for revision ACDF at at least 6 7 and possibly C7-T1 anteriorly. Risk and benefits were discussed with him. Procedure: Revision ACDF C6-7 and possible C7-T1. The risks and benefits were discussed at length with the patient and the patient has elected to proceed. Indications for surgery include failed conservative treatment. Alternative treatments, risks and the perioperative course were discussed with the patient. All questions were answered. The risks and benefits of the procedure were explained. Benefits include definitive diagnosis, relief of pain, elimination of deformity and improved function. Risks of surgery including bleeding, infection, weakness, numbness, CSF leak, failure to improve symptoms, exacerbation of medical co-morbidities and even death were discussed with the patient. 1. Cervical stenosis of spine  2. S/P cervical spinal fusion  3. Chronic bilateral low back pain with left-sided sciatica    No follow-ups on file. SUBJECTIVE/OBJECTIVE:  Jennifer Winston (: 1970) is a 47 y.o. male. Pain Assessment  10/25/2022   Location of Pain Back   Location Modifiers Posterior   Severity of Pain 7        The patient comes in today for follow-up.   He 6 weeks out from his lumbar laminectomy at L4-5 and overall is stable. He has been struggling more with the cervical pain he has had since his procedure. He has a previous ACDF at C5-6. He has bilateral arm pain. Imaging:    XR Results (most recent):  Results from Appointment encounter on 11/21/22    XR SPINE LUMB 2 OR 3 V    Narrative  InAP, lateral films of the lumbar spine reveal no evidence of acute fracture or lytic lesion. Stable L4-5 laminectomy. Worsening. No acute changes. MRI Results (most recent): MRI Results (most recent):  Results from [place] encounter on 10/05/22    MRI CERV SPINE WO CONT    Narrative  EXAM: MRI CERV SPINE WO CONT  Clinical history: Cervical radiculopathy  INDICATION: . Cervicalgia    COMPARISON: None    TECHNIQUE: MR imaging of the cervical spine was performed using the following  sequences: sagittal T1, T2, STIR;  axial T2, T1.    CONTRAST:  None. FINDINGS:    There is congenital narrowing of the cervical canal. There is no Chiari or  syrinx. There is no fracture or dislocation. Vertebral body heights are  maintained. Trace cord myelomalacia is likely chronic in nature and related to  chronic degenerative changes at C5-6. The craniocervical junction is intact. Status post ACDF at C5-6 with partial  osseous fusion. The paraspinal soft tissues are within normal limits. C2-C3: Disc bulge. Mild facet arthropathy. Canal is patent. Foramina are patent. C3-C4: Mild facet arthropathy. Mild central protrusion. Mild canal stenosis. Mild right foraminal stenosis. C4-C5: Mild facet arthropathy. Circumferential bulge/osteophyte. Mild canal  stenosis. Foramina are patent. C5-C6: Status post ACDF. Facet arthropathy is greater on the left. Canal  stenosis is mild. Moderate left foraminal stenosis. C6-C7: Disc desiccation loss of disc height. Circumferential  protrusion/osteophyte. Mild facet arthropathy. Moderate canal stenosis.  Severe  left foraminal stenosis. Moderate right foraminal stenosis. C7-T1: Disc desiccation. Lobulated protrusion/osteophyte. Canal is patent. Moderate to severe right and mild left foraminal stenosis. Impression  Multilevel disc and facet degenerative change with congenital narrowing of the  cervical canal.    There is moderate canal, severe left and moderate right foraminal stenosis at  C6-7. Moderate to severe right and mild left foraminal stenosis at C7-T1. Status post ACDF at C5-6 with moderate left foraminal stenosis at C5-6. No  unusual postprocedural findings at C5-6. No Known Allergies    Current Outpatient Medications   Medication Sig    naloxone (Narcan) 4 mg/actuation nasal spray Use 1 spray intranasally, then discard. Repeat with new spray every 2 min as needed for opioid overdose symptoms, alternating nostrils. mometasone (NASONEX) 50 mcg/actuation nasal spray 2 Sprays daily. amLODIPine (NORVASC) 5 mg tablet nightly. atorvastatin (LIPITOR) 20 mg tablet nightly. carvediloL (COREG) 25 mg tablet two (2) times a day. DULoxetine (CYMBALTA) 30 mg capsule 30 mg two (2) times a day. hydroCHLOROthiazide (HYDRODIURIL) 25 mg tablet daily. lisinopriL (PRINIVIL, ZESTRIL) 10 mg tablet daily. meloxicam (MOBIC) 15 mg tablet Take 15 mg by mouth daily. pregabalin (LYRICA) 100 mg capsule two (2) times a day. fluticasone propion-salmeteroL (ADVAIR/WIXELA) 100-50 mcg/dose diskus inhaler Take 1 Puff by inhalation every twelve (12) hours. No current facility-administered medications for this visit.        Past Medical History:   Diagnosis Date    Asthma     Hypertension     Sleep apnea         Past Surgical History:   Procedure Laterality Date    HX BACK SURGERY  04/15/2019    CERVICAL FUSION    HX BACK SURGERY  06/14/2019    LAMINECTOMY    HX BACK SURGERY  11/10/2020    SPINAL STIMULATOR    HX VASECTOMY         Family History   Problem Relation Age of Onset    No Known Problems Mother     No Known Problems Father     No Known Problems Brother     Anesth Problems Neg Hx         Social History     Tobacco Use    Smoking status: Never    Smokeless tobacco: Former     Quit date: 8/21/1999   Vaping Use    Vaping Use: Never used   Substance Use Topics    Alcohol use: Yes     Alcohol/week: 21.0 standard drinks     Types: 21 Shots of liquor per week        Review of Systems       Vitals:  Ht 6' 3\" (1.905 m)   Wt 276 lb (125.2 kg)   BMI 34.50 kg/m²    Body mass index is 34.5 kg/m². Ortho Exam       Alert and [place]  x 3    Normal gait and station; normal posture    No assistive devices today. Lumbar spine:  Examination of the lumbar spine demonstrates no tenderness on palpation, no pain, no swelling or edema, with normal lumbar range of motion. Thoracic spine: Examination of the thoracic spine demonstrates no tenderness on palpation, no pain, no swelling or edema. Normal sensation and range of motion. Cervical spine:     Examination of the cervical spine demonstrates on inspection: There are no cutaneous markings. Mild craniocervical lordosis. Previous surgical incision noted. .    On palpation: Tenderness on palpation cervical thoracic junction with motion across both shoulders. Range of motion: Limited range of motion. Motor examination:  Right deltoid 5/5,  left deltoid 5/5, right bicep 5/5, left bicep 5/5, right wrist extensor 5/5, left wrist extensor 5/5, right triceps 5/5, left triceps 4/5, right intrinsics 5/5, left intrinsics 4/5    Sensory examination: Reveals decrease sensation along the C7 and C8 dermatomes    Reflexes: Left bicep 2/2, left triceps 2/2, right biceps 2/2, right triceps 2/2    Functional testing: Spurling's exam is positive to the left; upper limb tension test is negative    Mcintosh's signs negative bilaterally. An electronic signature was used to authenticate this note.   -- Sheila Schmid MD

## 2022-12-30 ENCOUNTER — TELEPHONE (OUTPATIENT)
Dept: ORTHOPEDIC SURGERY | Age: 52
End: 2022-12-30

## 2022-12-30 DIAGNOSIS — M54.2 NECK PAIN: ICD-10-CM

## 2022-12-30 DIAGNOSIS — Z98.1 S/P CERVICAL SPINAL FUSION: ICD-10-CM

## 2022-12-30 DIAGNOSIS — M48.02 CERVICAL STENOSIS OF SPINE: Primary | ICD-10-CM

## 2023-01-18 NOTE — H&P
Barb Newton (: 1970) is a 47 y.o. male, patient, here for evaluation of the following chief complaint(s):  Surgical Follow-up (Sx 22 Revision Laminectomy/Discectomy L4/5 (PO#2))        ASSESSMENT/PLAN:     Below is the assessment and plan developed based on review of pertinent history, physical exam, labs, studies, and medications. In terms of his lower back he is improving slowly but steadily. He has lower back pain and some occasional left leg symptoms still. His main complaint is his neck at this time. He has known spondylosis C6-7 below his previous ACDF. He had 1 round of injections without significant relief. We had a lengthy discussion options. I think is a candidate for revision ACDF at at least 6 7 and possibly C7-T1 anteriorly. Risk and benefits were discussed with him. Procedure: Revision ACDF C6-7 and possible C7-T1. The risks and benefits were discussed at length with the patient and the patient has elected to proceed. Indications for surgery include failed conservative treatment. Alternative treatments, risks and the perioperative course were discussed with the patient. All questions were answered. The risks and benefits of the procedure were explained. Benefits include definitive diagnosis, relief of pain, elimination of deformity and improved function. Risks of surgery including bleeding, infection, weakness, numbness, CSF leak, failure to improve symptoms, exacerbation of medical co-morbidities and even death were discussed with the patient. Date of Surgery Update:  Barb Newton was seen and examined. History and physical has been reviewed. The patient has been examined. There have been no significant clinical changes since the completion of the originally dated History and Physical.    Signed By: Shobha Hester MD     2023 5:12 AM             1. Cervical stenosis of spine  2. S/P cervical spinal fusion  3.  Chronic bilateral low back pain with left-sided sciatica     No follow-ups on file. SUBJECTIVE/OBJECTIVE:  Sandie Ortiz (: 1970) is a 47 y.o. male. Pain Assessment  10/25/2022   Location of Pain Back   Location Modifiers Posterior   Severity of Pain 7         The patient comes in today for follow-up. He 6 weeks out from his lumbar laminectomy at L4-5 and overall is stable. He has been struggling more with the cervical pain he has had since his procedure. He has a previous ACDF at C5-6. He has bilateral arm pain. Imaging:     XR Results (most recent):  Results from Appointment encounter 9/15/2022     Narrative & Impression   AP and lateral cervical spine demonstrates a stable cervical fusion C5-C6. No hardware complications noted. Spondylosis above and below. Spurring noted. No gross instability. MRI Results (most recent):  Results from [place] encounter on 10/05/22     MRI CERV SPINE WO CONT     Narrative  EXAM: MRI CERV SPINE WO CONT  Clinical history: Cervical radiculopathy  INDICATION: . Cervicalgia     COMPARISON: None     TECHNIQUE: MR imaging of the cervical spine was performed using the following  sequences: sagittal T1, T2, STIR;  axial T2, T1.     CONTRAST:  None. FINDINGS:     There is congenital narrowing of the cervical canal. There is no Chiari or  syrinx. There is no fracture or dislocation. Vertebral body heights are  maintained. Trace cord myelomalacia is likely chronic in nature and related to  chronic degenerative changes at C5-6. The craniocervical junction is intact. Status post ACDF at C5-6 with partial  osseous fusion. The paraspinal soft tissues are within normal limits. C2-C3: Disc bulge. Mild facet arthropathy. Canal is patent. Foramina are patent. C3-C4: Mild facet arthropathy. Mild central protrusion. Mild canal stenosis. Mild right foraminal stenosis. C4-C5: Mild facet arthropathy.  Circumferential bulge/osteophyte. Mild canal  stenosis. Foramina are patent. C5-C6: Status post ACDF. Facet arthropathy is greater on the left. Canal  stenosis is mild. Moderate left foraminal stenosis. C6-C7: Disc desiccation loss of disc height. Circumferential  protrusion/osteophyte. Mild facet arthropathy. Moderate canal stenosis. Severe  left foraminal stenosis. Moderate right foraminal stenosis. C7-T1: Disc desiccation. Lobulated protrusion/osteophyte. Canal is patent. Moderate to severe right and mild left foraminal stenosis. Impression  Multilevel disc and facet degenerative change with congenital narrowing of the  cervical canal.     There is moderate canal, severe left and moderate right foraminal stenosis at  C6-7. Moderate to severe right and mild left foraminal stenosis at C7-T1. Status post ACDF at C5-6 with moderate left foraminal stenosis at C5-6. No  unusual postprocedural findings at C5-6. No Known Allergies          Current Outpatient Medications   Medication Sig    naloxone (Narcan) 4 mg/actuation nasal spray Use 1 spray intranasally, then discard. Repeat with new spray every 2 min as needed for opioid overdose symptoms, alternating nostrils. mometasone (NASONEX) 50 mcg/actuation nasal spray 2 Sprays daily. amLODIPine (NORVASC) 5 mg tablet nightly. atorvastatin (LIPITOR) 20 mg tablet nightly. carvediloL (COREG) 25 mg tablet two (2) times a day. DULoxetine (CYMBALTA) 30 mg capsule 30 mg two (2) times a day. hydroCHLOROthiazide (HYDRODIURIL) 25 mg tablet daily. lisinopriL (PRINIVIL, ZESTRIL) 10 mg tablet daily. meloxicam (MOBIC) 15 mg tablet Take 15 mg by mouth daily. pregabalin (LYRICA) 100 mg capsule two (2) times a day. fluticasone propion-salmeteroL (ADVAIR/WIXELA) 100-50 mcg/dose diskus inhaler Take 1 Puff by inhalation every twelve (12) hours. No current facility-administered medications for this visit.               Past Medical History:   Diagnosis Date    Asthma      Hypertension      Sleep apnea                 Past Surgical History:   Procedure Laterality Date    HX BACK SURGERY   04/15/2019     CERVICAL FUSION    HX BACK SURGERY   06/14/2019     LAMINECTOMY    HX BACK SURGERY   11/10/2020     SPINAL STIMULATOR    HX VASECTOMY                   Family History   Problem Relation Age of Onset    No Known Problems Mother      No Known Problems Father      No Known Problems Brother      Anesth Problems Neg Hx           Social History            Tobacco Use    Smoking status: Never    Smokeless tobacco: Former       Quit date: 8/21/1999   Vaping Use    Vaping Use: Never used   Substance Use Topics    Alcohol use: Yes       Alcohol/week: 21.0 standard drinks       Types: 21 Shots of liquor per week         Review of Systems        Vitals:  Ht 6' 3\" (1.905 m)   Wt 276 lb (125.2 kg)   BMI 34.50 kg/m²    Body mass index is 34.5 kg/m². Ortho Exam         Alert and [place]  x 3     Normal gait and station; normal posture     No assistive devices today. Lumbar spine:  Examination of the lumbar spine demonstrates no tenderness on palpation, no pain, no swelling or edema, with normal lumbar range of motion. Thoracic spine: Examination of the thoracic spine demonstrates no tenderness on palpation, no pain, no swelling or edema. Normal sensation and range of motion. Cervical spine:      Examination of the cervical spine demonstrates on inspection: There are no cutaneous markings. Mild craniocervical lordosis. Previous surgical incision noted. .     On palpation: Tenderness on palpation cervical thoracic junction with motion across both shoulders. Range of motion: Limited range of motion.      Motor examination:  Right deltoid 5/5,  left deltoid 5/5, right bicep 5/5, left bicep 5/5, right wrist extensor 5/5, left wrist extensor 5/5, right triceps 5/5, left triceps 4/5, right intrinsics 5/5, left intrinsics 4/5     Sensory examination: Reveals decrease sensation along the C7 and C8 dermatomes     Reflexes: Left bicep 2/2, left triceps 2/2, right biceps 2/2, right triceps 2/2     Functional testing: Spurling's exam is positive to the left; upper limb tension test is negative     Mcintosh's signs negative bilaterally. An electronic signature was used to authenticate this note.   -- Christopher Delgado MD       Electronically signed by Gage Harvey MD at 12/20/22 [phone]

## 2023-01-20 ENCOUNTER — HOSPITAL ENCOUNTER (OUTPATIENT)
Dept: PREADMISSION TESTING | Age: 53
End: 2023-01-20
Payer: COMMERCIAL

## 2023-01-20 VITALS
RESPIRATION RATE: 18 BRPM | HEIGHT: 75 IN | SYSTOLIC BLOOD PRESSURE: 126 MMHG | HEART RATE: 67 BPM | DIASTOLIC BLOOD PRESSURE: 82 MMHG | BODY MASS INDEX: 35.5 KG/M2 | TEMPERATURE: 97.8 F | WEIGHT: 285.5 LBS

## 2023-01-20 LAB
ABO + RH BLD: NORMAL
APPEARANCE UR: CLEAR
BACTERIA URNS QL MICRO: NEGATIVE /HPF
BILIRUB UR QL: NEGATIVE
BLOOD GROUP ANTIBODIES SERPL: NORMAL
COLOR UR: NORMAL
EPITH CASTS URNS QL MICRO: NORMAL /LPF
EST. AVERAGE GLUCOSE BLD GHB EST-MCNC: 120 MG/DL
GLUCOSE UR STRIP.AUTO-MCNC: NEGATIVE MG/DL
HBA1C MFR BLD: 5.8 % (ref 4–5.6)
HGB UR QL STRIP: NEGATIVE
HYALINE CASTS URNS QL MICRO: NORMAL /LPF (ref 0–5)
KETONES UR QL STRIP.AUTO: NEGATIVE MG/DL
LEUKOCYTE ESTERASE UR QL STRIP.AUTO: NEGATIVE
NITRITE UR QL STRIP.AUTO: NEGATIVE
PH UR STRIP: 7 (ref 5–8)
PROT UR STRIP-MCNC: NEGATIVE MG/DL
RBC #/AREA URNS HPF: NORMAL /HPF (ref 0–5)
SP GR UR REFRACTOMETRY: 1.01 (ref 1–1.03)
SPECIMEN EXP DATE BLD: NORMAL
UA: UC IF INDICATED,UAUC: NORMAL
UROBILINOGEN UR QL STRIP.AUTO: 0.2 EU/DL (ref 0.2–1)
WBC URNS QL MICRO: NORMAL /HPF (ref 0–4)

## 2023-01-20 PROCEDURE — 86900 BLOOD TYPING SEROLOGIC ABO: CPT

## 2023-01-20 PROCEDURE — 36415 COLL VENOUS BLD VENIPUNCTURE: CPT

## 2023-01-20 PROCEDURE — 81001 URINALYSIS AUTO W/SCOPE: CPT

## 2023-01-20 PROCEDURE — 83036 HEMOGLOBIN GLYCOSYLATED A1C: CPT

## 2023-01-20 NOTE — PERIOP NOTES
PT DID NOT BRING COPY OF COVID VACCINE CARD TO PAT APPOINTMENT, BUT NURSE VERIFIED VACCINATION STATUS IS CORRECT. PT DECLINES TO STAY AT THE [facility]. SPOKE [address] [facility] [address], NP. [address].

## 2023-01-20 NOTE — PERIOP NOTES
[address] INSTRUCTIONS  ORTHOPAEDIC    Surgery Date:   1/25/23    Your surgeon's office or [facility] staff will call you between 4 PM- 8 PM the day before surgery with your arrival time. If your surgery is on a Monday, you will receive a call the preceding Friday. Please report to [address] Patient Access/Admitting on the 1st floor. Bring your insurance card, photo identification, and any copayment (if applicable). If you are going home the same day of your surgery, you must have a responsible adult to drive you home. You need to have a responsible adult to stay with you the first 24 hours after surgery and you should not drive a car for 24 hours following your surgery. Do NOT eat any solid foods after midnight the night before surgery including candy, mints or gum. You may drink clear liquids from midnight until 1 hour prior to arrival time. You may drink up to 12 ounces at one time every 4 hours. Do NOT drink alcohol or smoke 24 hours before surgery. STOP smoking for 14 days prior as it helps with breathing and healing after surgery. If your arrival time is 3pm or later, you may eat a light breakfast before 8am (toast, bagel-no butter, black coffee, plain tea, fruit juice-no pulp) Please note special instructions, if applicable, below for medications. If you are being admitted to the hospital,please leave personal belongings/luggage in your car until you have an assigned hospital room number. Please wear comfortable clothes. Wear your glasses instead of contacts. We ask that all money, jewelry and valuables be left at home. Wear no make up, particularly mascara, the day of surgery. All body piercings, rings, and jewelry need to be removed and left at home. Please remove any nail polish or artificial nails from your fingernails. Please wear your hair loose or down. Please no pony-tails, buns, or any metal hair accessories.  If you shower the morning of surgery, please do not apply any lotions or powders afterwards. You may wear deodorant. Do not shave any body area within 24 hours of your surgery. Please follow all instructions to avoid any potential surgical cancellation. Should your physical condition change, (i.e. fever, cold, flu, etc.) please notify your surgeon as soon as possible. It is important to be on time. If a situation occurs where you may be delayed, please call:  [phone] / [phone] on the day of surgery. The Preadmission Testing staff can be reached at [phone]. Special instructions: NONE    Current Outpatient Medications   Medication Sig    naloxone (Narcan) 4 mg/actuation nasal spray Use 1 spray intranasally, then discard. Repeat with new spray every 2 min as needed for opioid overdose symptoms, alternating nostrils. mometasone (NASONEX) 50 mcg/actuation nasal spray 2 Sprays daily. amLODIPine (NORVASC) 5 mg tablet nightly. atorvastatin (LIPITOR) 20 mg tablet nightly. carvediloL (COREG) 25 mg tablet two (2) times a day. DULoxetine (CYMBALTA) 30 mg capsule 30 mg two (2) times a day. hydroCHLOROthiazide (HYDRODIURIL) 25 mg tablet daily. lisinopriL (PRINIVIL, ZESTRIL) 10 mg tablet daily. pregabalin (LYRICA) 100 mg capsule two (2) times a day. fluticasone propion-salmeteroL (ADVAIR/WIXELA) 100-50 mcg/dose diskus inhaler Take 1 Puff by inhalation every twelve (12) hours. meloxicam (MOBIC) 15 mg tablet Take 15 mg by mouth daily. (Patient not taking: Reported on 1/20/2023)     No current facility-administered medications for this encounter.        YOU MUST ONLY TAKE THESE MEDICATIONS THE MORNING OF SURGERY WITH A SIP OF WATER: CARVEDILOL, PREGABALIN, ADVAIR, AMLODIPINE, NASONEX, CYMBALTA  MEDICATIONS TO TAKE THE MORNING OF SURGERY ONLY IF NEEDED: NONE  HOLD these prescription medications BEFORE Surgery: DO NOT TAKE LISINOPRIL DAY OF SURGERY, DO NOT TAKE HYDROCHLOROTHIAZIDE DAY OF SURGERY  Ask your surgeon/prescribing physician about when/if to STOP taking these medications: NONE  Stop any non-steroidal anti-inflammatory drugs (i.e. Ibuprofen, Naproxen, Advil, Aleve) 3 days before surgery. You may take Tylenol. STOP all vitamins and herbal supplements 1 week prior to  surgery. If you are currently taking Plavix, Coumadin, or any other blood-thinning/anticoagulant medication contact your prescribing physician for instructions. Preventing Infections Before and After - Your Surgery    IMPORTANT INSTRUCTIONS    You play an important role in your health and preparation for surgery. To reduce the germs on your skin you will need to shower with CHG soap (Chorhexidine gluconate 4%) two times before surgery. CHG soap (Hibiclens, Hex-A-Clens or store brand)  CHG soap will be provided at your Preadmission Testing (PAT) appointment. If you do not have a PAT appointment before surgery, you may arrange to  CHG soap from our office or purchase CHG soap at a pharmacy, grocery or department store. You need to purchase TWO 4 ounce bottles to use for your 2 showers. Steps to follow:  Kiran Roup your hair with your normal shampoo and your body with regular soap and rinse well to remove shampoo and soap from your skin. Wet a clean washcloth and turn off the shower. Put CHG soap on washcloth and apply to your entire body from the neck down. Do not use on your head, face or private parts(genitals). Do not use CHG soap on open sores, wounds or areas of skin irritation. Wash you body gently for 5 minutes. Do not wash your skin too hard. This soap does not create lather. Pay special attention to your underarms and from your belly button to your feet. Turn the shower back on and rinse well to get CHG soap off your body. Pat your skin dry with a clean, dry towel. Do not apply lotions or moisturizer. Put on clean clothes and sleep on fresh bed sheets and do not allow pets to sleep with you.     Shower with CHG soap 2 times before your surgery  The evening before your surgery  The morning of your surgery      Tips to help prevent infections after your surgery:  Protect your surgical wound from germs:  Hand washing is the most important thing you and your caregivers can do to prevent infections. Keep your bandage clean and dry! Do not touch your surgical wound. Use clean, freshly washed towels and washcloths every time you shower; do not share bath linens with others. Until your surgical wound is healed, wear clothing and sleep on bed linens each day that are clean and freshly washed. Do not allow pets to sleep in your bed with you or touch your surgical wound. Do not smoke - smoking delays wound healing. This may be a good time to stop smoking. If you have diabetes, it is important for you to manage your blood sugar levels properly before your surgery as well as after your surgery. Poorly managed blood sugar levels slow down wound healing and prevent you from healing completely. Prevention of Infection  Testing for Staphylococcus aureus on your skin before surgery    Staphylococcus aureus (staph) is a common bacteria that is found on the body. It normally does not cause infection on healthy skin. Before surgery, you will be tested to see if you have staph by swabbing the inside of your nose. When you have an incision with surgery, the goal is to protect that incision from infection. Removal of the staph bacteria before surgery can decrease the risk of a surgical site infection. If your nose swab is positive for staph you will be called. Your treatment will include 2 steps:  Prescription for Mupirocin ointment to be used in each nostril twice a day for 5 days. Showering with Chlorhexidine (CHG) liquid soap for 5 days prior to surgery. How to use Mupirocin ointment in your nose   the prescription from your pharmacy. You will receive a large tube of ointment which will be big enough for all of your treatments.  You will apply this ointment to each nostril 2 times a day for 5 days. Wash your hands with  gel or soap and water for 20 seconds before using ointment. Place a pea-sized amount of ointment on a cotton Q-tip. Apply ointment just inside of each nostril with the Q-tip. Do not push Q-tip or ointment deep inside you nose. Press your nostrils together and massage for a few seconds. Wash your hands with  gel or soap and water after you are finished. Do not get ointment near your eyes. If it gets into your eyes, rinse them with cool water. If you need to use nasal spray, clean the tip of the bottle with alcohol before use and do not use both at the same time. If you are scheduled for COVID testing during the 5 days, do NOT apply morning dose until after the COVID test has been performed. How to use Chlorhexidine (CHG) 4% liquid soap  Purchase an 8 ounce bottle of CHG liquid soap (Chlorhexidine 4%, Hibiclens, Hex-A-Clens or store brand) at a pharmacy or grocery store. Wash your hair with your normal shampoo and your body with regular soap and rinse well to remove shampoo and soap from your skin. Wet a clean washcloth and turn off the shower. Put CHG soap on washcloth and apply to your entire body from the neck down. Do not use on your head, face or private parts(genitals). Do not use CHG soap on open sores, wounds or areas of skin irritation. Wash your body gently for 5 minutes. Do not wash your skin too hard. This soap does not create lather. Pay special attention to your underarms and from your belly button to your feet. Turn the shower back on and rinse well to get CHG soap off your body. Pat your skin dry with a clean, dry towel. Do not apply lotions or moisturizer. Put on clean clothes and sleep on fresh bed sheets the night before surgery. Do not allow pets to sleep with you.     Eating and Drinking Before Surgery    You may eat a regular dinner at the usual time on the day before your surgery. Do NOT eat any solid foods after midnight unless your arrival time at the hospital is 3pm or later. You may drink clear liquids only from 12 midnight until 1 hours prior to your arrival time at the hospital on the day of your surgery. Do NOT drink alcohol. Clear liquids include:  Water  Fruit juices without pulp( i.e. apple juice)  Carbonated beverages  Black coffee (no cream/milk)  Tea (no cream/milk)  Gatorade  You may drink up to 12-16 ounces at one time every 4 hours between the hours of midnight and 1 hour before your arrival time at the hospital. Example- if your arrival time at the hospital is 6am, you may drink 12-16 ounces of clear liquids no later than 5am.  If your arrival time at the hospital is 3pm or later, you may eat a light breakfast before 8am.  A light breakfast includes: Toast or bagel (no butter)  Black coffee (no cream/milk)  Tea (no cream/milk)  Fruit juices without pulp ( i.e. apple juice)  Do NOT eat meat, eggs, vegetables or fruit  If you have any questions, please contact your surgeon's office. Patient Information Regarding COVID Restrictions    Day of Procedure    Please park in the parking deck or any designated visitor parking lot. Enter the facility through the Main Entrance of the hospital.  On the day of surgery, please provide the cell phone number of the person who will be waiting for you to the Patient Access representative at the time of registration. Masks are highly recommended in the hospital, but not required. Once your procedure and the immediate recovery period is completed, a nurse in the recovery area will contact your designated visitor to inform them of your room number or to otherwise review other pertinent information regarding your care. Social distancing practices are strongly encouraged in waiting areas and the cafeteria. The patient was contacted in person.    He verbalized understanding of all instructions does not  need reinforcement.

## 2023-01-21 LAB
BACTERIA SPEC CULT: NORMAL
BACTERIA SPEC CULT: NORMAL
SERVICE CMNT-IMP: NORMAL

## 2023-01-23 NOTE — PERIOP NOTES
PAT Nurse Practitioner   Pre-Operative Chart Review/Assessment:-ORTHOPEDIC/NEUROSURGICAL SPINE                Patient Name:  Kendra Benson                                                           Age:   47 y.o.    :  1970     Today's Date:  2023     Date of PAT:   23      Date of Surgery:    23      Procedure(s):  REVISION ANTERIOR CERVICAL DISCECTOMY WITH FUSION C6-7 AND POSSIBLE C7-T1     Surgeon:   Dr. Ila Obrien                       PLAN:       1)  PCP: Elaine Carter Sr, MD        2)  Cardiac Clearance: Pt followed by Dr. Gayla Soares). LOV 22. Condition stable, no changes to current regimen. Notes in CE. 3)  Diabetic Treatment Consult: Not indicated. A1c-5.8       4)  Sleep Apnea evaluation:  +AZAR dx. Pt uses CPAP.        5)  Treatment for MRSA/Staph Aureus: Neg       6)  Additional Concerns: Former smoker, EtOH: 21 drinks/wk, HTN, NICM(EF 55-60%), GERD, Asthma              Vital Signs:         Vitals:    23 1119   BP: 126/82   Pulse: 67   Resp: 18   Temp: 97.8 °F (36.6 °C)   Weight: 129.5 kg (285 lb 7.9 oz)   Height: 6' 3\" (1.905 m)          Body mass index is 35.68 kg/m².       ____________________________________________  PAST MEDICAL HISTORY  Past Medical History:   Diagnosis Date    Asthma     Chronic pain     LEFT LEG, BACK    GERD (gastroesophageal reflux disease)     Hypertension     Sleep apnea     CPAP      ____________________________________________  PAST SURGICAL HISTORY  Past Surgical History:   Procedure Laterality Date    HX BACK SURGERY  04/15/2019    CERVICAL FUSION    HX BACK SURGERY  2019    LAMINECTOMY    HX BACK SURGERY  11/10/2020    SPINAL STIMULATOR    HX BACK SURGERY  2021    LAMINECTOMY    HX COLONOSCOPY  2023    2 POLYPS REMOVED    HX ENDOSCOPY      HX VASECTOMY      HX WISDOM TEETH EXTRACTION       ____________________________________________  HOME MEDICATIONS    Current Outpatient Medications   Medication Sig    naloxone (Narcan) 4 mg/actuation nasal spray Use 1 spray intranasally, then discard. Repeat with new spray every 2 min as needed for opioid overdose symptoms, alternating nostrils. mometasone (NASONEX) 50 mcg/actuation nasal spray 2 Sprays daily. amLODIPine (NORVASC) 5 mg tablet nightly. atorvastatin (LIPITOR) 20 mg tablet nightly. carvediloL (COREG) 25 mg tablet two (2) times a day. DULoxetine (CYMBALTA) 30 mg capsule 30 mg two (2) times a day. hydroCHLOROthiazide (HYDRODIURIL) 25 mg tablet daily. lisinopriL (PRINIVIL, ZESTRIL) 10 mg tablet daily. pregabalin (LYRICA) 100 mg capsule two (2) times a day. fluticasone propion-salmeteroL (ADVAIR/WIXELA) 100-50 mcg/dose diskus inhaler Take 1 Puff by inhalation every twelve (12) hours. meloxicam (MOBIC) 15 mg tablet Take 15 mg by mouth daily. (Patient not taking: Reported on 2023)     No current facility-administered medications for this encounter.      ____________________________________________  ALLERGIES  No Known Allergies   ____________________________________________  SOCIAL HISTORY  Social History     Tobacco Use    Smoking status: Former     Packs/day: 1.00     Years: 33.00     Pack years: 33.00     Types: Cigarettes     Quit date:      Years since quittin.0    Smokeless tobacco: Former     Quit date: 1999   Substance Use Topics    Alcohol use:  Yes     Alcohol/week: 21.0 standard drinks     Types: 21 Shots of liquor per week      ____________________________________________   Internal Administration   First Dose COVID-19, MODERNA BLUE border, Primary or Immunocompromised, (age 18y+), IM, 100 mcg/0.5mL  2021   Second Dose COVID-19, MODERNA BLUE border, Primary or Immunocompromised, (age 18y+), IM, 100 mcg/0.5mL  09/15/2021      Last COVID Lab No results found for: [facility], RCV2CT, CVD2M, [place], XPLCVT, [address] , [address] [place] [address], [facility] Maddie Foy, [address] [place] [address] Stuart Parson ____________________________________________    Labs:     Hospital Outpatient Visit on 01/20/2023   Component Date Value Ref Range Status    Crossmatch Expiration 01/20/2023 01/28/2023,2359   Final    ABO/Rh(D) 01/20/2023 O POSITIVE   Final    Antibody screen 01/20/2023 NEG   Final    Color 01/20/2023 YELLOW/STRAW    Final    Color Reference Range: Straw, Yellow or Dark Yellow    Appearance 01/20/2023 CLEAR  CLEAR   Final    Specific gravity 01/20/2023 1.009  1.003 - 1.030   Final    pH (UA) 01/20/2023 7.0  5.0 - 8.0   Final    Protein 01/20/2023 Negative  NEG mg/dL Final    Glucose 01/20/2023 Negative  NEG mg/dL Final    Ketone 01/20/2023 Negative  NEG mg/dL Final    Bilirubin 01/20/2023 Negative  NEG   Final    Blood 01/20/2023 Negative  NEG   Final    Urobilinogen 01/20/2023 0.2  0.2 - 1.0 EU/dL Final    Nitrites 01/20/2023 Negative  NEG   Final    Leukocyte Esterase 01/20/2023 Negative  NEG   Final    UA:UC IF INDICATED 01/20/2023 CULTURE NOT INDICATED BY UA RESULT  CNI   Final    WBC 01/20/2023 0-4  0 - 4 /hpf Final    RBC 01/20/2023 0-5  0 - 5 /hpf Final    Epithelial cells 01/20/2023 FEW  FEW /lpf Final    Epithelial cell category consists of squamous cells and /or transitional urothelial cells. Renal tubular cells, if present, are separately identified as such.     Bacteria 01/20/2023 Negative  NEG /hpf Final    Hyaline cast 01/20/2023 0-2  0 - 5 /lpf Final    Hemoglobin A1c 01/20/2023 5.8 (A)  4.0 - 5.6 % Final    Comment: NEW METHOD  PLEASE NOTE NEW REFERENCE RANGE  (NOTE)  HbA1C Interpretive Ranges  <5.7              Normal  5.7 - 6.4         Consider Prediabetes  >6.5              Consider Diabetes      Est. average glucose 01/20/2023 120  mg/dL Final    Special Requests: 01/20/2023 NO SPECIAL REQUESTS    Final    Culture result: 01/20/2023 MRSA NOT PRESENT    Final    Culture result: 01/20/2023     Final                    Value:Screening of patient nares for MRSA is for surveillance purposes and, if positive, to facilitate isolation considerations in high risk settings. It is not intended for automatic decolonization interventions per se as regimens are not sufficiently effective to warrant routine use. Skin:     Denies open wounds, cuts, sores, rashes or other areas of concern in PAT assessment.         Feng Carr NP  Available via [facility]

## 2023-01-24 ENCOUNTER — ANESTHESIA EVENT (OUTPATIENT)
Dept: SURGERY | Age: 53
End: 2023-01-24
Payer: COMMERCIAL

## 2023-01-25 ENCOUNTER — APPOINTMENT (OUTPATIENT)
Dept: GENERAL RADIOLOGY | Age: 53
End: 2023-01-25
Attending: ORTHOPAEDIC SURGERY
Payer: COMMERCIAL

## 2023-01-25 ENCOUNTER — HOSPITAL ENCOUNTER (OUTPATIENT)
Age: 53
Discharge: HOME | End: 2023-01-25
Attending: ORTHOPAEDIC SURGERY | Admitting: ORTHOPAEDIC SURGERY
Payer: COMMERCIAL

## 2023-01-25 ENCOUNTER — ANESTHESIA (OUTPATIENT)
Dept: SURGERY | Age: 53
End: 2023-01-25
Payer: COMMERCIAL

## 2023-01-25 VITALS
BODY MASS INDEX: 35.5 KG/M2 | SYSTOLIC BLOOD PRESSURE: 118 MMHG | WEIGHT: 285.5 LBS | HEIGHT: 75 IN | RESPIRATION RATE: 16 BRPM | OXYGEN SATURATION: 95 % | DIASTOLIC BLOOD PRESSURE: 60 MMHG | TEMPERATURE: 97.7 F | HEART RATE: 69 BPM

## 2023-01-25 DIAGNOSIS — Z98.1 S/P CERVICAL SPINAL FUSION: ICD-10-CM

## 2023-01-25 DIAGNOSIS — M48.02 CERVICAL STENOSIS OF SPINAL CANAL: Primary | ICD-10-CM

## 2023-01-25 LAB
GLUCOSE BLD STRIP.AUTO-MCNC: 118 MG/DL (ref 65–117)
SERVICE CMNT-IMP: ABNORMAL

## 2023-01-25 PROCEDURE — 77030038600 HC TU BPLR IRR DISP STRY -B: Performed by: ORTHOPAEDIC SURGERY

## 2023-01-25 PROCEDURE — 77030004391 HC BUR FLUT MEDT -C: Performed by: ORTHOPAEDIC SURGERY

## 2023-01-25 PROCEDURE — 74011000250 HC RX REV CODE- 250: Performed by: NURSE ANESTHETIST, CERTIFIED REGISTERED

## 2023-01-25 PROCEDURE — 77030034475 HC MISC IMPL SPN: Performed by: ORTHOPAEDIC SURGERY

## 2023-01-25 PROCEDURE — 77030026438 HC STYL ET INTUB CARD -A: Performed by: ANESTHESIOLOGY

## 2023-01-25 PROCEDURE — C1713 ANCHOR/SCREW BN/BN,TIS/BN: HCPCS | Performed by: ORTHOPAEDIC SURGERY

## 2023-01-25 PROCEDURE — 74011250636 HC RX REV CODE- 250/636: Performed by: ANESTHESIOLOGY

## 2023-01-25 PROCEDURE — 77030035236 HC SUT PDS STRATFX BARB J&J -B: Performed by: ORTHOPAEDIC SURGERY

## 2023-01-25 PROCEDURE — C1889 IMPLANT/INSERT DEVICE, NOC: HCPCS | Performed by: ORTHOPAEDIC SURGERY

## 2023-01-25 PROCEDURE — 74011250636 HC RX REV CODE- 250/636: Performed by: PHYSICIAN ASSISTANT

## 2023-01-25 PROCEDURE — 74011000250 HC RX REV CODE- 250: Performed by: ORTHOPAEDIC SURGERY

## 2023-01-25 PROCEDURE — 77030008684 HC TU ET CUF COVD -B: Performed by: ANESTHESIOLOGY

## 2023-01-25 PROCEDURE — 77030040506 HC DRN WND MDII -A: Performed by: ORTHOPAEDIC SURGERY

## 2023-01-25 PROCEDURE — 77030019908 HC STETH ESOPH SIMS -A: Performed by: ANESTHESIOLOGY

## 2023-01-25 PROCEDURE — 76060000035 HC ANESTHESIA 2 TO 2.5 HR: Performed by: ORTHOPAEDIC SURGERY

## 2023-01-25 PROCEDURE — 76010000162 HC OR TIME 1.5 TO 2 HR INTENSV-TIER 1: Performed by: ORTHOPAEDIC SURGERY

## 2023-01-25 PROCEDURE — 77030040922 HC BLNKT HYPOTHRM STRY -A

## 2023-01-25 PROCEDURE — 74011000258 HC RX REV CODE- 258: Performed by: NURSE ANESTHETIST, CERTIFIED REGISTERED

## 2023-01-25 PROCEDURE — 76210000021 HC REC RM PH II 0.5 TO 1 HR: Performed by: ORTHOPAEDIC SURGERY

## 2023-01-25 PROCEDURE — 2709999900 HC NON-CHARGEABLE SUPPLY: Performed by: ORTHOPAEDIC SURGERY

## 2023-01-25 PROCEDURE — 77030029099 HC BN WAX SSPC -A: Performed by: ORTHOPAEDIC SURGERY

## 2023-01-25 PROCEDURE — 77030014650 HC SEAL MTRX FLOSEL BAXT -C: Performed by: ORTHOPAEDIC SURGERY

## 2023-01-25 PROCEDURE — 82962 GLUCOSE BLOOD TEST: CPT

## 2023-01-25 PROCEDURE — 77030003832 HC BIT DRL ATLNTS MEDT -B: Performed by: ORTHOPAEDIC SURGERY

## 2023-01-25 PROCEDURE — 74011250637 HC RX REV CODE- 250/637: Performed by: ANESTHESIOLOGY

## 2023-01-25 PROCEDURE — 74011250636 HC RX REV CODE- 250/636: Performed by: NURSE ANESTHETIST, CERTIFIED REGISTERED

## 2023-01-25 PROCEDURE — 76210000016 HC OR PH I REC 1 TO 1.5 HR: Performed by: ORTHOPAEDIC SURGERY

## 2023-01-25 PROCEDURE — 77030037713 HC CLOSR DEV INCIS ZIP STRY -B: Performed by: ORTHOPAEDIC SURGERY

## 2023-01-25 PROCEDURE — 77030002934 HC SUT MCRYL J&J -B: Performed by: ORTHOPAEDIC SURGERY

## 2023-01-25 PROCEDURE — 72020 X-RAY EXAM OF SPINE 1 VIEW: CPT

## 2023-01-25 DEVICE — GRAFT BNE SUB SM CANC FRZN MORSELIZED W/ VIABLE CELL: Type: IMPLANTABLE DEVICE | Site: SPINE CERVICAL | Status: FUNCTIONAL

## 2023-01-25 DEVICE — I-FACTOR PUTTY, 1.0CC SYRINGE
Type: IMPLANTABLE DEVICE | Site: SPINE CERVICAL | Status: FUNCTIONAL
Brand: I-FACTOR PEPTIDE ENHANCED BONE GRAFT

## 2023-01-25 DEVICE — SCREW 3120517 4.0 X 17 SELF DRILL VAR
Type: IMPLANTABLE DEVICE | Site: SPINE CERVICAL | Status: FUNCTIONAL
Brand: ATLANTIS® ANTERIOR CERVICAL PLATE SYSTEM

## 2023-01-25 DEVICE — GRAFT HUM TISS 3X4 CM WND COVERING AMNIO MEMBRN VERSASHIELD: Type: IMPLANTABLE DEVICE | Site: SPINE CERVICAL | Status: FUNCTIONAL

## 2023-01-25 DEVICE — INTERBODY FUSION DEVICE  6 DEGREE LARGE 9MM
Type: IMPLANTABLE DEVICE | Site: SPINE CERVICAL | Status: FUNCTIONAL
Brand: ENDOSKELETON® TC NANOLOCK® SURFACE TECHNOLOGY

## 2023-01-25 RX ORDER — MIDAZOLAM HYDROCHLORIDE 1 MG/ML
INJECTION, SOLUTION INTRAMUSCULAR; INTRAVENOUS AS NEEDED
Status: DISCONTINUED | OUTPATIENT
Start: 2023-01-25 | End: 2023-01-25 | Stop reason: HOSPADM

## 2023-01-25 RX ORDER — OXYCODONE HYDROCHLORIDE 5 MG/1
5-10 TABLET ORAL
Qty: 60 TABLET | Refills: 0 | Status: SHIPPED | OUTPATIENT
Start: 2023-01-25 | End: 2023-01-30

## 2023-01-25 RX ORDER — DEXMEDETOMIDINE HYDROCHLORIDE 100 UG/ML
INJECTION, SOLUTION INTRAVENOUS AS NEEDED
Status: DISCONTINUED | OUTPATIENT
Start: 2023-01-25 | End: 2023-01-25 | Stop reason: HOSPADM

## 2023-01-25 RX ORDER — DIPHENHYDRAMINE HYDROCHLORIDE 50 MG/ML
12.5 INJECTION, SOLUTION INTRAMUSCULAR; INTRAVENOUS
Status: CANCELLED | OUTPATIENT
Start: 2023-01-25 | End: 2023-01-26

## 2023-01-25 RX ORDER — SODIUM CHLORIDE 0.9 % (FLUSH) 0.9 %
5-40 SYRINGE (ML) INJECTION EVERY 8 HOURS
Status: CANCELLED | OUTPATIENT
Start: 2023-01-25

## 2023-01-25 RX ORDER — SODIUM CHLORIDE 0.9 % (FLUSH) 0.9 %
5-40 SYRINGE (ML) INJECTION AS NEEDED
Status: DISCONTINUED | OUTPATIENT
Start: 2023-01-25 | End: 2023-01-25 | Stop reason: HOSPADM

## 2023-01-25 RX ORDER — KETAMINE HCL IN 0.9 % NACL 50 MG/5 ML
SYRINGE (ML) INTRAVENOUS AS NEEDED
Status: DISCONTINUED | OUTPATIENT
Start: 2023-01-25 | End: 2023-01-25 | Stop reason: HOSPADM

## 2023-01-25 RX ORDER — ACETAMINOPHEN 500 MG
1000 TABLET ORAL EVERY 6 HOURS
Status: CANCELLED | OUTPATIENT
Start: 2023-01-25

## 2023-01-25 RX ORDER — PROPOFOL 10 MG/ML
INJECTION, EMULSION INTRAVENOUS
Status: DISCONTINUED | OUTPATIENT
Start: 2023-01-25 | End: 2023-01-25 | Stop reason: HOSPADM

## 2023-01-25 RX ORDER — CARVEDILOL 12.5 MG/1
25 TABLET ORAL 2 TIMES DAILY
Status: CANCELLED | OUTPATIENT
Start: 2023-01-25

## 2023-01-25 RX ORDER — SODIUM CHLORIDE 0.9 % (FLUSH) 0.9 %
5-40 SYRINGE (ML) INJECTION EVERY 8 HOURS
Status: DISCONTINUED | OUTPATIENT
Start: 2023-01-25 | End: 2023-01-25 | Stop reason: HOSPADM

## 2023-01-25 RX ORDER — FENTANYL CITRATE 50 UG/ML
50 INJECTION, SOLUTION INTRAMUSCULAR; INTRAVENOUS AS NEEDED
Status: DISCONTINUED | OUTPATIENT
Start: 2023-01-25 | End: 2023-01-25 | Stop reason: HOSPADM

## 2023-01-25 RX ORDER — OXYCODONE HYDROCHLORIDE 5 MG/1
10 TABLET ORAL
Status: CANCELLED | OUTPATIENT
Start: 2023-01-25

## 2023-01-25 RX ORDER — ACETAMINOPHEN 325 MG/1
650 TABLET ORAL ONCE
Status: COMPLETED | OUTPATIENT
Start: 2023-01-25 | End: 2023-01-25

## 2023-01-25 RX ORDER — ONDANSETRON 2 MG/ML
4 INJECTION INTRAMUSCULAR; INTRAVENOUS AS NEEDED
Status: DISCONTINUED | OUTPATIENT
Start: 2023-01-25 | End: 2023-01-25 | Stop reason: HOSPADM

## 2023-01-25 RX ORDER — DULOXETIN HYDROCHLORIDE 30 MG/1
30 CAPSULE, DELAYED RELEASE ORAL 2 TIMES DAILY
Status: CANCELLED | OUTPATIENT
Start: 2023-01-25

## 2023-01-25 RX ORDER — NALOXONE HYDROCHLORIDE 4 MG/.1ML
SPRAY NASAL
Qty: 1 EACH | Refills: 0 | Status: SHIPPED | OUTPATIENT
Start: 2023-01-25

## 2023-01-25 RX ORDER — SODIUM CHLORIDE 0.9 % (FLUSH) 0.9 %
5-40 SYRINGE (ML) INJECTION AS NEEDED
Status: CANCELLED | OUTPATIENT
Start: 2023-01-25

## 2023-01-25 RX ORDER — SODIUM CHLORIDE 9 MG/ML
125 INJECTION, SOLUTION INTRAVENOUS CONTINUOUS
Status: CANCELLED | OUTPATIENT
Start: 2023-01-25 | End: 2023-01-26

## 2023-01-25 RX ORDER — PROPOFOL 10 MG/ML
INJECTION, EMULSION INTRAVENOUS AS NEEDED
Status: DISCONTINUED | OUTPATIENT
Start: 2023-01-25 | End: 2023-01-25 | Stop reason: HOSPADM

## 2023-01-25 RX ORDER — LIDOCAINE HYDROCHLORIDE 20 MG/ML
INJECTION, SOLUTION EPIDURAL; INFILTRATION; INTRACAUDAL; PERINEURAL AS NEEDED
Status: DISCONTINUED | OUTPATIENT
Start: 2023-01-25 | End: 2023-01-25 | Stop reason: HOSPADM

## 2023-01-25 RX ORDER — FENTANYL CITRATE 50 UG/ML
25 INJECTION, SOLUTION INTRAMUSCULAR; INTRAVENOUS
Status: COMPLETED | OUTPATIENT
Start: 2023-01-25 | End: 2023-01-25

## 2023-01-25 RX ORDER — ATORVASTATIN CALCIUM 20 MG/1
20 TABLET, FILM COATED ORAL
Status: CANCELLED | OUTPATIENT
Start: 2023-01-25

## 2023-01-25 RX ORDER — HYDROMORPHONE HYDROCHLORIDE 2 MG/ML
INJECTION, SOLUTION INTRAMUSCULAR; INTRAVENOUS; SUBCUTANEOUS AS NEEDED
Status: DISCONTINUED | OUTPATIENT
Start: 2023-01-25 | End: 2023-01-25 | Stop reason: HOSPADM

## 2023-01-25 RX ORDER — POLYETHYLENE GLYCOL 3350 17 G/17G
17 POWDER, FOR SOLUTION ORAL DAILY
Status: CANCELLED | OUTPATIENT
Start: 2023-01-25

## 2023-01-25 RX ORDER — MORPHINE SULFATE 2 MG/ML
2 INJECTION, SOLUTION INTRAMUSCULAR; INTRAVENOUS
Status: CANCELLED | OUTPATIENT
Start: 2023-01-25 | End: 2023-01-26

## 2023-01-25 RX ORDER — ROCURONIUM BROMIDE 10 MG/ML
INJECTION, SOLUTION INTRAVENOUS AS NEEDED
Status: DISCONTINUED | OUTPATIENT
Start: 2023-01-25 | End: 2023-01-25 | Stop reason: HOSPADM

## 2023-01-25 RX ORDER — SODIUM CHLORIDE, SODIUM LACTATE, POTASSIUM CHLORIDE, CALCIUM CHLORIDE 600; 310; 30; 20 MG/100ML; MG/100ML; MG/100ML; MG/100ML
INJECTION, SOLUTION INTRAVENOUS
Status: DISCONTINUED | OUTPATIENT
Start: 2023-01-25 | End: 2023-01-25 | Stop reason: HOSPADM

## 2023-01-25 RX ORDER — PREGABALIN 100 MG/1
100 CAPSULE ORAL 2 TIMES DAILY
Status: CANCELLED | OUTPATIENT
Start: 2023-01-25

## 2023-01-25 RX ORDER — OXYCODONE HYDROCHLORIDE 5 MG/1
5 TABLET ORAL ONCE
Status: COMPLETED | OUTPATIENT
Start: 2023-01-25 | End: 2023-01-25

## 2023-01-25 RX ORDER — DEXAMETHASONE SODIUM PHOSPHATE 4 MG/ML
INJECTION, SOLUTION INTRA-ARTICULAR; INTRALESIONAL; INTRAMUSCULAR; INTRAVENOUS; SOFT TISSUE AS NEEDED
Status: DISCONTINUED | OUTPATIENT
Start: 2023-01-25 | End: 2023-01-25 | Stop reason: HOSPADM

## 2023-01-25 RX ORDER — HYDROCHLOROTHIAZIDE 25 MG/1
25 TABLET ORAL DAILY
Status: CANCELLED | OUTPATIENT
Start: 2023-01-25

## 2023-01-25 RX ORDER — ONDANSETRON 2 MG/ML
4 INJECTION INTRAMUSCULAR; INTRAVENOUS
Status: CANCELLED | OUTPATIENT
Start: 2023-01-25 | End: 2023-01-26

## 2023-01-25 RX ORDER — BUDESONIDE 0.25 MG/2ML
250 INHALANT ORAL
Status: CANCELLED | OUTPATIENT
Start: 2023-01-25

## 2023-01-25 RX ORDER — FACIAL-BODY WIPES
10 EACH TOPICAL DAILY PRN
Status: CANCELLED | OUTPATIENT
Start: 2023-01-27

## 2023-01-25 RX ORDER — OXYCODONE HYDROCHLORIDE 5 MG/1
5 TABLET ORAL
Status: CANCELLED | OUTPATIENT
Start: 2023-01-25

## 2023-01-25 RX ORDER — DIAZEPAM 5 MG/1
5 TABLET ORAL
Status: CANCELLED | OUTPATIENT
Start: 2023-01-25

## 2023-01-25 RX ORDER — MIDAZOLAM HYDROCHLORIDE 1 MG/ML
1 INJECTION, SOLUTION INTRAMUSCULAR; INTRAVENOUS AS NEEDED
Status: DISCONTINUED | OUTPATIENT
Start: 2023-01-25 | End: 2023-01-25 | Stop reason: HOSPADM

## 2023-01-25 RX ORDER — HYDROMORPHONE HYDROCHLORIDE 1 MG/ML
0.2 INJECTION, SOLUTION INTRAMUSCULAR; INTRAVENOUS; SUBCUTANEOUS
Status: DISCONTINUED | OUTPATIENT
Start: 2023-01-25 | End: 2023-01-25 | Stop reason: HOSPADM

## 2023-01-25 RX ORDER — AMLODIPINE BESYLATE 5 MG/1
5 TABLET ORAL
Status: CANCELLED | OUTPATIENT
Start: 2023-01-25

## 2023-01-25 RX ORDER — NALOXONE HYDROCHLORIDE 0.4 MG/ML
0.4 INJECTION, SOLUTION INTRAMUSCULAR; INTRAVENOUS; SUBCUTANEOUS AS NEEDED
Status: CANCELLED | OUTPATIENT
Start: 2023-01-25

## 2023-01-25 RX ORDER — FENTANYL CITRATE 50 UG/ML
INJECTION, SOLUTION INTRAMUSCULAR; INTRAVENOUS AS NEEDED
Status: DISCONTINUED | OUTPATIENT
Start: 2023-01-25 | End: 2023-01-25 | Stop reason: HOSPADM

## 2023-01-25 RX ORDER — LIDOCAINE HYDROCHLORIDE 10 MG/ML
0.1 INJECTION, SOLUTION EPIDURAL; INFILTRATION; INTRACAUDAL; PERINEURAL AS NEEDED
Status: DISCONTINUED | OUTPATIENT
Start: 2023-01-25 | End: 2023-01-25 | Stop reason: HOSPADM

## 2023-01-25 RX ORDER — ONDANSETRON 2 MG/ML
INJECTION INTRAMUSCULAR; INTRAVENOUS AS NEEDED
Status: DISCONTINUED | OUTPATIENT
Start: 2023-01-25 | End: 2023-01-25 | Stop reason: HOSPADM

## 2023-01-25 RX ORDER — SUCCINYLCHOLINE CHLORIDE 20 MG/ML
INJECTION INTRAMUSCULAR; INTRAVENOUS AS NEEDED
Status: DISCONTINUED | OUTPATIENT
Start: 2023-01-25 | End: 2023-01-25 | Stop reason: HOSPADM

## 2023-01-25 RX ORDER — SODIUM CHLORIDE, SODIUM LACTATE, POTASSIUM CHLORIDE, CALCIUM CHLORIDE 600; 310; 30; 20 MG/100ML; MG/100ML; MG/100ML; MG/100ML
50 INJECTION, SOLUTION INTRAVENOUS CONTINUOUS
Status: DISCONTINUED | OUTPATIENT
Start: 2023-01-25 | End: 2023-01-25 | Stop reason: HOSPADM

## 2023-01-25 RX ORDER — ARFORMOTEROL TARTRATE 15 UG/2ML
15 SOLUTION RESPIRATORY (INHALATION)
Status: CANCELLED | OUTPATIENT
Start: 2023-01-25

## 2023-01-25 RX ORDER — CYCLOBENZAPRINE HCL 10 MG
10 TABLET ORAL
Status: CANCELLED | OUTPATIENT
Start: 2023-01-25

## 2023-01-25 RX ORDER — LISINOPRIL 10 MG/1
10 TABLET ORAL DAILY
Status: CANCELLED | OUTPATIENT
Start: 2023-01-25

## 2023-01-25 RX ORDER — AMOXICILLIN 250 MG
1 CAPSULE ORAL 2 TIMES DAILY
Status: CANCELLED | OUTPATIENT
Start: 2023-01-25

## 2023-01-25 RX ADMIN — DEXMEDETOMIDINE HYDROCHLORIDE 10 MCG: 100 INJECTION, SOLUTION, CONCENTRATE INTRAVENOUS at 10:54

## 2023-01-25 RX ADMIN — MIDAZOLAM HYDROCHLORIDE 3 MG: 1 INJECTION, SOLUTION INTRAMUSCULAR; INTRAVENOUS at 08:48

## 2023-01-25 RX ADMIN — ONDANSETRON HYDROCHLORIDE 4 MG: 2 SOLUTION INTRAMUSCULAR; INTRAVENOUS at 12:02

## 2023-01-25 RX ADMIN — SUCCINYLCHOLINE CHLORIDE 200 MG: 20 INJECTION, SOLUTION INTRAMUSCULAR; INTRAVENOUS at 08:57

## 2023-01-25 RX ADMIN — ONDANSETRON HYDROCHLORIDE 4 MG: 2 INJECTION, SOLUTION INTRAMUSCULAR; INTRAVENOUS at 09:07

## 2023-01-25 RX ADMIN — ACETAMINOPHEN 650 MG: 325 TABLET ORAL at 08:16

## 2023-01-25 RX ADMIN — FENTANYL CITRATE 25 MCG: 50 INJECTION, SOLUTION INTRAMUSCULAR; INTRAVENOUS at 11:35

## 2023-01-25 RX ADMIN — ROCURONIUM BROMIDE 10 MG: 10 SOLUTION INTRAVENOUS at 08:57

## 2023-01-25 RX ADMIN — DEXAMETHASONE SODIUM PHOSPHATE 8 MG: 4 INJECTION, SOLUTION INTRAMUSCULAR; INTRAVENOUS at 09:07

## 2023-01-25 RX ADMIN — PROPOFOL 400 MG: 10 INJECTION, EMULSION INTRAVENOUS at 08:57

## 2023-01-25 RX ADMIN — PHENYLEPHRINE HYDROCHLORIDE 25 MCG/MIN: 10 INJECTION INTRAVENOUS at 09:17

## 2023-01-25 RX ADMIN — OXYCODONE HYDROCHLORIDE 5 MG: 5 TABLET ORAL at 12:02

## 2023-01-25 RX ADMIN — FENTANYL CITRATE 100 MCG: 50 INJECTION, SOLUTION INTRAMUSCULAR; INTRAVENOUS at 08:57

## 2023-01-25 RX ADMIN — FENTANYL CITRATE 25 MCG: 50 INJECTION, SOLUTION INTRAMUSCULAR; INTRAVENOUS at 11:30

## 2023-01-25 RX ADMIN — DEXMEDETOMIDINE HYDROCHLORIDE 10 MCG: 100 INJECTION, SOLUTION, CONCENTRATE INTRAVENOUS at 10:49

## 2023-01-25 RX ADMIN — PROPOFOL 150 MCG/KG/MIN: 10 INJECTION, EMULSION INTRAVENOUS at 09:01

## 2023-01-25 RX ADMIN — Medication 50 MG: at 08:57

## 2023-01-25 RX ADMIN — SODIUM CHLORIDE, POTASSIUM CHLORIDE, SODIUM LACTATE AND CALCIUM CHLORIDE: 600; 310; 30; 20 INJECTION, SOLUTION INTRAVENOUS at 08:45

## 2023-01-25 RX ADMIN — MIDAZOLAM HYDROCHLORIDE 2 MG: 1 INJECTION, SOLUTION INTRAMUSCULAR; INTRAVENOUS at 08:51

## 2023-01-25 RX ADMIN — SODIUM CHLORIDE, POTASSIUM CHLORIDE, SODIUM LACTATE AND CALCIUM CHLORIDE 50 ML/HR: 600; 310; 30; 20 INJECTION, SOLUTION INTRAVENOUS at 08:19

## 2023-01-25 RX ADMIN — HYDROMORPHONE HYDROCHLORIDE 0.5 MG: 2 INJECTION, SOLUTION INTRAMUSCULAR; INTRAVENOUS; SUBCUTANEOUS at 10:38

## 2023-01-25 RX ADMIN — Medication 3 G: at 09:21

## 2023-01-25 RX ADMIN — FENTANYL CITRATE 25 MCG: 50 INJECTION, SOLUTION INTRAMUSCULAR; INTRAVENOUS at 11:20

## 2023-01-25 RX ADMIN — FENTANYL CITRATE 25 MCG: 50 INJECTION, SOLUTION INTRAMUSCULAR; INTRAVENOUS at 11:25

## 2023-01-25 RX ADMIN — HYDROMORPHONE HYDROCHLORIDE 1 MG: 2 INJECTION, SOLUTION INTRAMUSCULAR; INTRAVENOUS; SUBCUTANEOUS at 10:59

## 2023-01-25 RX ADMIN — LIDOCAINE HYDROCHLORIDE 100 MG: 20 INJECTION, SOLUTION EPIDURAL; INFILTRATION; INTRACAUDAL; PERINEURAL at 08:57

## 2023-01-25 RX ADMIN — HYDROMORPHONE HYDROCHLORIDE 0.5 MG: 2 INJECTION, SOLUTION INTRAMUSCULAR; INTRAVENOUS; SUBCUTANEOUS at 10:49

## 2023-01-25 NOTE — ANESTHESIA POSTPROCEDURE EVALUATION
Post-Anesthesia Evaluation and Assessment    Patient: Carlos Strickland MRN: [id number]  SSN: [id number]    YOB: 1970  Age: 47 y.o. Sex: male      I have evaluated the patient and they are stable and ready for discharge from the PACU. Cardiovascular Function/Vital Signs  Visit Vitals  BP (!) 140/61   Pulse 72   Temp 36.5 °C (97.7 °F)   Resp 15   Ht 6' 3\" (1.905 m)   Wt 129.5 kg (285 lb 7.9 oz)   SpO2 96%   BMI 35.68 kg/m²       Patient is status post General anesthesia for Procedure(s):  REVISION ANTERIOR CERVICAL DISCECTOMY WITH FUSION C6-7 AND POSSIBLE C7-T1. Nausea/Vomiting: None    Postoperative hydration reviewed and adequate. Pain:  Pain Scale 1: Numeric (0 - 10) (01/25/23 1145)  Pain Intensity 1: 4 (01/25/23 1145)   Managed    Neurological Status:   Neuro (WDL): Within Defined Limits (01/25/23 1145)  Neuro  Neurologic State: Alert;Eyes open to voice (01/25/23 1145)  Orientation Level: Oriented X4 (01/25/23 1145)  Cognition: Appropriate decision making; Appropriate for age attention/concentration; Appropriate safety awareness; Follows commands (01/25/23 1145)  Speech: Clear (01/25/23 1145)  LUE Motor Response: Purposeful (01/25/23 1145)  LLE Motor Response: Purposeful (01/25/23 1145)  RUE Motor Response: Purposeful (01/25/23 1145)  RLE Motor Response: Purposeful (01/25/23 1145)   At baseline    Mental Status, Level of Consciousness: Alert and  oriented to person, place, and time    Pulmonary Status:   O2 Device: None (Room air) (01/25/23 1145)   Adequate oxygenation and airway patent    Complications related to anesthesia: None    Post-anesthesia assessment completed.  No concerns    Signed By: Saranya Medley MD     January 25, 2023

## 2023-01-25 NOTE — PROGRESS NOTES
01/25/23 0932   Family Communication   Family Update Message Procedure started   Delivery Origin Nurse    Relationship to Patient Spouse    Phone Number [phone]   Family/Significant Other Update Called

## 2023-01-25 NOTE — OP NOTES
[facility]  OPERATIVE REPORT    Name:  Danitza Carpenter  MR#:  [id number]  :  1970  ACCOUNT #:  [de-identified]  DATE OF SERVICE:  2023    PREOPERATIVE DIAGNOSES:  1. Cervical stenosis C6-7.  2.  Cervical radiculopathy. 3.  Status post anterior cervical diskectomy and fusion C5-6. POSTOPERATIVE DIAGNOSES:  1. Cervical stenosis C6-7.  2.  Cervical radiculopathy. 3.  Status post anterior cervical diskectomy and fusion C5-6. PROCEDURES PERFORMED:  1. Anterior cervical diskectomy C6-7.  2.  Anterior interbody fusion C6-7.  3.  Anterior cervical fusion C5, C6, and C7 with plating. SURGEON:  Romulo Rueda MD    ASSISTANT:  Migel Chase PA-C    ANESTHESIA:  General.    COMPLICATIONS:  None. SPECIMENS REMOVED:  None. IMPLANTS:  Medtronic Elite ACP and Medtronic Titan TC interbody graft. ESTIMATED BLOOD LOSS:  Minimal.    INDICATIONS:  This is a pleasant 81-year-old gentleman with chronic history of neck pain and bilateral shoulder pain. Previous history of C5-6 ACDF, cervical stenosis at C6-7 with spondylosis, with bilateral foraminal narrowing which is severe. He has failed conservative treatment, understood the risks and benefits, and elected to proceed. PROCEDURE:  The patient was identified, brought to the operative suite, underwent general anesthesia without difficulty. Placed in supine position. 10 pounds of traction across the head with chin strap. Preoperative neuromonitoring was placed and baselines obtained and these remained stable throughout the surgical procedure. Neck was prepped and draped sterilely. A time-out was confirmed. A transverse incision was made approximately at the level of the cricoid cartilage on the left hand side. Dissection was carried down to the platysma. This was split longitudinally. Blunt dissection medial to the sternocleidomastoid down to deep cervical fascia.   We carefully dissected the previous cervical plate, identified it and removed it without difficulty. We removed the locking mechanism and then the subsequent four screws. We then explored the C6-7 disk space. Longus colli were elevated. Deep radiolucent retractors were placed. X-rays were used to confirm the appropriate level. At which time, we then did an annulotomy and then did complete diskectomy with removal of disk material and cartilaginous endplates. The cartilaginous endplates were removed with curettes as well. Posterior vertebral osteophytes were taken down with the Midas bur. Posterior longitudinal ligament was elevated with a nerve hook and then resected with #1 and #2 Kerrisons. We also undercut the vertebral body of C6 and C7 to aid in further decompression of the central canal.  We did bilateral foraminal decompression. Prior to undercutting the uncinate processes, a blunt nerve hook could be passed in the foramen without any impingement. After satisfactory decompression and hemostasis with bipolar cautery, we then did trial spacers with large Titan TC interbody grafting system. The large footprint was an 18 mm x 16 mm footprint. We rasped the endplate to lightly bleeding bone. 9 mm height was chosen for foraminal distraction as well as restoration of disk space height. The graft was packed with i-FACTOR as well as Mariann allograft. It was then impacted in place with 2 mm countersinking. Good distraction was noted. Neuromonitoring was stable. FloSeal for hemostasis. We then contoured a 42 mm Medtronic Elite plate to the anterior cervical spine. Temporary fixation with a threaded pin followed by 4.0 x 17 mm screws into C5, C6, C7. Locking mechanisms were engaged. Final x-rays looked stable. FloSeal for hemostasis. VersaShield for anti-adhesion. 3-0 Monocryl on the skin. Steri-Strips placed. The patient returned to the PACU in stable condition.     [facility] present for the entirety of the procedure and vital for the performance of the procedure. Blaire Phan, [facility] assisted with positioning, prepping, draping of the patient before the procedure and instrument manipulation/placement, spinal repositioning and flouroscopic operation during the procedure as well as wound closure, dressing application and brace application after the procedure.  Please note that no intern, resident, or other hospital staff was available to assist during the surgery       Delfino Jaquez MD      [id number]  D:  01/25/2023 10:28  T:  01/25/2023 14:22  JOB #:  [id number]

## 2023-01-25 NOTE — DISCHARGE INSTRUCTIONS
After Hospital Care Plan:  Discharge Instructions Cervical (Neck) Spine Surgery Dr. Regan Meier    Patient Name: Ghulam Salazar    Date of procedure: 1/25/2023  Date of discharge: 1/25/2023    Procedure: Procedure(s):  REVISION ANTERIOR CERVICAL DISCECTOMY WITH FUSION C6-7 AND POSSIBLE C7-T1  PCP: @PCP@    Follow up appointments   -follow up with Dr. Regan Meier in 2 weeks. Call [phone] to make an appointment as soon as you get home from the hospital.    When to call your Orthopaedic Surgeon:  -Difficulty swallowing that is worse than when you left the hospital.  -Signs of infection-if your incision is red; continues to have drainage; drainage has a foul odor or if you have a persistent fever over 101 degrees for 24 hours  -nausea or vomiting, severe headache  -changes in sensation in your arms or legs (numbness, tingling, loss of color)  -increased weakness-greater than before your surgery  -severe pain or pain not relieved by medications  -Signs of a blood clot in your leg-calf pain, tenderness, redness, swelling of lower leg    When to call your Primary Care Physician:  -Concerns about medical conditions such as diabetes, high blood pressure, asthma, congestive heart failure  -Call if blood sugars are elevated, persistent headache or dizziness, coughing or congestion, constipation or diarrhea, burning with urination, abnormal heart rate    When to call 911 and go to the nearest emergency room:  -acute onset of chest pain, shortness of breath, difficulty breathing    Activity  - You are going home a well person, be as active as possible. Your only exercise should be walking. Start with short frequent walks and increase your walking distance each day.  -Limit the amount of time you sit to 20-30 minute intervals. Sitting for prolonged periods of time will be uncomfortable for you following surgery.   - Do NOT lift anything over 5 pounds  -From now on, even when lifting light weight, bend with your knees and not your back.  -Do NOT do any neck exercises until you have been instructed by your doctor  -When you are in bed, you may lay on your back or on either side. Do NOT lie on your stomach    Cervical Collar (Aspen Collar)  -You are required to wear your cervical collar at all times; except when showering. You may remove the collar long enough to change the pads when needed and to change your dressing each day. -Do not bend or twist when your collar is off. It is best to have someone assist you when changing the pads and your dressing to prevent you from bending your neck. - Clean the pads on your neck collar every day by hand washing with a mild soap and water. Pat them dry with a towel and lay out to air dry. Do not use heat to dry the pads. Driving  -You may not drive or return to work until instructed by your physician. However, you may ride in the car for short periods of time. Incision Care  Your incision has been closed with absorbable sutures and steri-strips. This will assist with healing. Steri-strips are to remain on your incision for 2 weeks. A dry dressing (ABD and tape) will be placed over it and should be changed daily, for at least the first several days after your surgery. If you have no incisional drainage, you may leave the incision open to air if you wish, still leaving the steri-strips in place. Please make sure to wash your hands prior to touching your dressing. You may take brief showers but do not run the water directly onto the wound. After your shower, blot your incision dry with a soft towel and replace the dry dressing. Do not allow the tape to come in contact with the steri-strips. Do not rub or apply any lotions or ointments to your incision site. Do not soak or scrub your wound. Your steri-strips will be removed during your two week follow-up appointment.  If you experience drainage leaking from underneath the steri-strips or if it peels off before 2 weeks, please contact your orthopedic surgeons office. Showering  -You may shower in approximately 2 days after your surgery.    -Leave the dressing on during your shower. Do NOT allow the water to run directly onto your dressing. Once you get out of the shower, put on a dry dressing.  -Reminder- Make sure you put clean pads on your collar after your shower.    -Do not take a tub bath. Preventing blood clots  -You have been given T.E.D. stockings to wear. Continue to wear these for 7 days after your discharge. Put them on in the morning and take them off at night.    -They are used to increase your circulation and prevent blood clots from forming in your legs  -T. E.D. stockings can be machine washed, temperature not to exceed 160° F (71°C) and machine dried for 15 to 20 minutes, temperature not to exceed 250° F (121°C). Pain management  -Take pain medication as prescribed; decrease the amount you use as your pain lessens  -Do not wait until you are in extreme pain to take your medication.  -Avoid alcoholic beverages while taking pain medication    Pain Medication Safety  DO:  -Read the Medication Guide   -Take your medicine exactly as prescribed   -Store your medicine away from children and in a safe place   -Flush unused medicine down the toilet   -Call your healthcare provider for medical advice about side effects. You may report side effects to FDA at 2-813-FDA-4569.   -Please be aware that many medications contain Tylenol. We do not want you to over medicate so please read the information below as a guide. Do not take more than 4 Grams of Tylenol in a 24 hour period.   (There are 1000 milligrams in one Gram)                                                                                                                                                                                                    Percocet contains 325 mg of Tylenol per tablet (do not take more than 12 tablets in 24 hours)  Lortab contains 500 mg of Tylenol per tablet (do not take more than 8 tablets in 24 hours)  Norco contains 325 mg of Tylenol per tablet (do not take more than 12 tablets in 24 hours). DO NOT:  -Do not give your medicine to others   -Do not take medicine unless it was prescribed for you   -Do not stop taking your medicine without talking to your healthcare provider   -Do not break, chew, crush, dissolve, or inject your medicine. If you cannot  swallow your medicine whole, talk to your healthcare provider.  -Do not drink alcohol while taking this medicine  -Do not take anti-inflammatory medications or aspirin unless instructed by your     Physician. Diet  -resume usual diet; drink plenty of fluids; eat foods high in fiber  -It is important to have regular bowel movements. Pain medications may cause constipation. You may want to take a stool softener (such as Senokot-S or Colace) to prevent constipation. If constipation occurs, take a laxative (such as Dulcolax tablets, Milk of Magnesia, or a suppository). Laxatives should only be used if the above preventable measures have failed and you still have not had a bowel movement after three days. ______________________________________________________________________    Anesthesia Discharge Instructions    After general anesthesia or intervenous sedation, for 24 hours or while taking prescription Narcotics:  Limit your activities  Do not drive or operate hazardous machinery  If you have not urinated within 8 hours after discharge, please contact your surgeon on call.   Do not make important personal or business decisions  Do not drink alcoholic beverages    Report the following to your surgeon:  Excessive pain, swelling, redness or odor of or around the surgical area  Temperature over 100.5 degrees  Nausea and vomiting lasting longer than 4 hours or if unable to take medication  Any signs of decreased circulation or nerve impairment to extremity: Change in color, persistent numbness, tingling, coldness or increased pain.   Any questions     Took oxycodone at 12:02 pm

## 2023-01-25 NOTE — ANESTHESIA PREPROCEDURE EVALUATION
Relevant Problems   No relevant active problems       Anesthetic History   No history of anesthetic complications            Review of Systems / Medical History  Patient summary reviewed, nursing notes reviewed and pertinent labs reviewed    Pulmonary        Sleep apnea    Asthma        Neuro/Psych   Within defined limits           Cardiovascular    Hypertension              Exercise tolerance: <4 METS     GI/Hepatic/Renal     GERD           Endo/Other  Within defined limits           Other Findings              Physical Exam    Airway  Mallampati: II  TM Distance: 4 - 6 cm  Neck ROM: normal range of motion   Mouth opening: Normal     Cardiovascular  Regular rate and rhythm,  S1 and S2 normal,  no murmur, click, rub, or gallop             Dental  No notable dental hx       Pulmonary  Breath sounds clear to auscultation               Abdominal  GI exam deferred       Other Findings            Anesthetic Plan    ASA: 2  Anesthesia type: general          Induction: Intravenous  Anesthetic plan and risks discussed with: Patient

## 2023-01-25 NOTE — BRIEF OP NOTE
Brief Postoperative Note    Patient: Agnes Lobo  YOB: 1970  MRN: [id number]    Date of Procedure: 1/25/2023     Pre-Op Diagnosis: CERVICAL STENOSIS OF SPINE  S/P CERVICAL SPINAL FUSION  NECK PAIN    Post-Op Diagnosis: Same as preoperative diagnosis. Procedure(s):  REVISION ANTERIOR CERVICAL DISCECTOMY WITH FUSION C6-7 AND POSSIBLE C7-T1    Surgeon(s):  Therman Skiff, MD    Surgical Assistant: Physician Assistant: ENRRIQUE Mendes    Anesthesia: General     Estimated Blood Loss (mL): Minimal    Complications: None    Specimens: * No specimens in log *     Implants:   Implant Name Type Inv. Item Serial No.  Lot No. LRB No. Used Action   PUTTY BNE GRFT 1 CC W/ SYR I FACTOR - SN. A  PUTTY BNE GRFT 1 CC W/ SYR I FACTOR N.A [employer]_WD [id number] N/A 1 Implanted   GRAFT BNE SUB SM CANC FRZN MORSELIZED W/ VIABLE CELL - [id number]  GRAFT BNE SUB SM CANC FRZN MORSELIZED W/ VIABLE CELL [id number] [employer]_ NA N/A 1 Implanted   GRAFT HUM TISS 3X4 CM WND COVERING AMNIO MEMBRN VERSASHIELD - [id number]  GRAFT HUM TISS 3X4 CM WND COVERING AMNIO MEMBRN VERSASHIELD [id number] [employer]_ NA N/A 1 Implanted   CAGE SPNL 6 DEG LG [id number] MM ENDOSKELETON TC [place]OCK - SNA  CAGE SPNL 6 DEG LG [id number] MM ENDOSKELETON TC [place]AN[place]OCK NA [employer] DANEK_WD [id number] N/A 1 Implanted   PLATE SPNL B34.9CP ANT CERV ATLNTS VISN ELITE - SNA  PLATE SPNL B65.3CT ANT CERV ATLNTS VISN ELITE NA [employer] SOFAMOR DANEK_WD NA N/A 1 Implanted   SCREW SPNL L17MM DIA4MM SELF DRL NOE ANG FOR ANT CERV PLT - SNA  SCREW SPNL L17MM DIA4MM SELF DRL NOE ANG FOR ANT CERV PLT NA [employer] SOFAMOR DANEK_WD NA N/A 6 Implanted       Drains: * No LDAs found *    Findings: DDD    Electronically Signed by ENRRIQUE Granados on 1/25/2023 at 10:40 AM

## 2023-02-14 ENCOUNTER — OFFICE VISIT (OUTPATIENT)
Dept: ORTHOPEDIC SURGERY | Age: 53
End: 2023-02-14
Payer: COMMERCIAL

## 2023-02-14 ENCOUNTER — OFFICE VISIT (OUTPATIENT)
Dept: ORTHOPEDIC SURGERY | Age: 53
End: 2023-02-14

## 2023-02-14 VITALS — BODY MASS INDEX: 35.43 KG/M2 | WEIGHT: 285 LBS | HEIGHT: 75 IN

## 2023-02-14 VITALS — HEIGHT: 75 IN | BODY MASS INDEX: 35.43 KG/M2 | WEIGHT: 285 LBS

## 2023-02-14 DIAGNOSIS — Z98.1 S/P CERVICAL SPINAL FUSION: Primary | ICD-10-CM

## 2023-02-14 DIAGNOSIS — M25.562 CHRONIC PAIN OF LEFT KNEE: Primary | ICD-10-CM

## 2023-02-14 DIAGNOSIS — G89.29 CHRONIC PAIN OF LEFT KNEE: Primary | ICD-10-CM

## 2023-02-14 PROCEDURE — 99024 POSTOP FOLLOW-UP VISIT: CPT | Performed by: STUDENT IN AN ORGANIZED HEALTH CARE EDUCATION/TRAINING PROGRAM

## 2023-02-14 RX ORDER — METHYLPREDNISOLONE ACETATE 40 MG/ML
40 INJECTION, SUSPENSION INTRA-ARTICULAR; INTRALESIONAL; INTRAMUSCULAR; SOFT TISSUE ONCE
Status: COMPLETED | OUTPATIENT
Start: 2023-02-14 | End: 2023-02-14

## 2023-02-14 RX ADMIN — METHYLPREDNISOLONE ACETATE 40 MG: 40 INJECTION, SUSPENSION INTRA-ARTICULAR; INTRALESIONAL; INTRAMUSCULAR; SOFT TISSUE at 15:57

## 2023-02-14 NOTE — PROGRESS NOTES
Ivan Lawson (: 1970) is a 47 y.o. male patient, here for evaluation of the following chief complaint(s):  Knee Pain (Left knee pain/)       ASSESSMENT/PLAN:  Below is the assessment and plan developed based on review of pertinent history, physical exam, labs, studies, and medications. 50-year-old male comes in today for evaluation of left knee. Has been intermittently going on for years. States years ago he had an injury to this knee which resulted in an effusion pain and swelling. Since then he has had intermittent pain. X-rays today show overall relatively well-preserved joint space, he does have a small osteophyte formation noted to the medial compartment. Symptoms most likely consistent with possible worsening arthritic changes than noted on x-ray versus meniscus pathology. Discussed treatment options with patient. He is unable to take NSAIDs as he is status post a recent cervical fusion. He would like to try a steroid injection. After verbal consent was obtained I injected  3mL Lidocaine 40 mg DepoMedrol into the left knee joint using sterile technique. Patient tolerated well. Encouraged physical therapy exercises, activity modification ice rest as needed. Follow-up as needed. Also patient that if no improvement in symptoms would consider getting an MRI for further evaluation given history of injury. 1. Chronic pain of left knee  -     XR KNEE LT MIN 4 V; Future      Encounter Diagnosis   Name Primary? Chronic pain of left knee Yes        No follow-ups on file. SUBJECTIVE/OBJECTIVE:  Ivan Lawson (: 1970) is a 47 y.o. male who presents today for the following:  Chief Complaint   Patient presents with    Knee Pain     Left knee pain         50-year-old male comes in today for evaluation of left knee. Has been intermittently going on for years. States years ago he had an injury to this knee which resulted in an effusion pain and swelling.   Since then he has had intermittent pain. IMAGING:  XR Results (most recent):  Results from Appointment encounter on 02/14/23    XR KNEE LT MIN 4 V    Narrative  4 views left knee x-rays ordered and personally reviewed. Overall joint space appears relatively well-preserved medial lateral compartment. He does have a small osteophyte formation noted in the medial compartment. Mild maltracking of the patella. No Known Allergies    Current Outpatient Medications   Medication Sig    mometasone (NASONEX) 50 mcg/actuation nasal spray 2 Sprays daily. amLODIPine (NORVASC) 5 mg tablet nightly. atorvastatin (LIPITOR) 20 mg tablet nightly. carvediloL (COREG) 25 mg tablet two (2) times a day. DULoxetine (CYMBALTA) 30 mg capsule 30 mg two (2) times a day. hydroCHLOROthiazide (HYDRODIURIL) 25 mg tablet daily. lisinopriL (PRINIVIL, ZESTRIL) 10 mg tablet daily. pregabalin (LYRICA) 100 mg capsule two (2) times a day. fluticasone propion-salmeteroL (ADVAIR/WIXELA) 100-50 mcg/dose diskus inhaler Take 1 Puff by inhalation every twelve (12) hours. No current facility-administered medications for this visit.        Past Medical History:   Diagnosis Date    Asthma     Chronic pain     LEFT LEG, BACK    GERD (gastroesophageal reflux disease)     Hypertension     Sleep apnea     CPAP        Past Surgical History:   Procedure Laterality Date    HX BACK SURGERY  04/15/2019    CERVICAL FUSION    HX BACK SURGERY  06/14/2019    LAMINECTOMY    HX BACK SURGERY  11/10/2020    SPINAL STIMULATOR    HX BACK SURGERY  11/2021    LAMINECTOMY    HX COLONOSCOPY  01/19/2023    2 POLYPS REMOVED    HX ENDOSCOPY      HX VASECTOMY      HX WISDOM TEETH EXTRACTION         Family History   Problem Relation Age of Onset    No Known Problems Mother     No Known Problems Father     No Known Problems Brother     Anesth Problems Neg Hx         Social History     Tobacco Use    Smoking status: Former     Packs/day: 1.00     Years: 33.00 Pack years: 33.00     Types: Cigarettes     Quit date: 200     Years since quittin.1    Smokeless tobacco: Former     Quit date: 1999   Substance Use Topics    Alcohol use: Yes     Alcohol/week: 21.0 standard drinks     Types: 21 Shots of liquor per week        All systems reviewed x 12 and were negative with the exception of None      Pain Assessment  2023   Location of Pain Neck   Location Modifiers Posterior   Severity of Pain 6          Vitals:  Ht 6' 3\" (1.905 m)   Wt 285 lb (129.3 kg)   BMI 35.62 kg/m²    Body mass index is 35.62 kg/m². Physical Exam    General: NAD, well developed, well nourished, alert and oriented x 3. Cardiac: Extremities well perfused    Respiratory: Nonlabored breathing    LLE: Mild antalgia gait. Mild effusion noted. No previous incisions noted. ROM 0-120 degrees. Grossly stable to varus/valgus stress and anterior/posterior drawer tests. Equivocal McMurrays. Medial joint line tenderness. Motor grossly intact. RLE: Normal gait and station. Negative stinchfield. No effusion noted. No previous incisions noted. ROM 0-120 degrees. Grossly stable to varus/valgus stress and anterior/posterior drawer tests. Negative McMurrays. Motor grossly intact. Vascular: Palpable pedal pulses, equal bilaterally. Skin: Warm well perfused, cap refill < 2 sec. Melecio Kaye M.D. was available for immediate consultation as the supervising physician. An electronic signature was used to authenticate this note.   -- Amber Carreon PA-C

## 2023-02-14 NOTE — PROGRESS NOTES
Niki Hart (: 1970) is a 47 y.o. male, patient, here for evaluation of the following chief complaint(s):  Surgical Follow-up       ASSESSMENT/PLAN:  Below is the assessment and plan developed based on review of pertinent history, physical exam, labs, studies, and medications. Patient presents today approximately 2 weeks status post recent ACDF. Radiologic findings reviewed in detail with the patient. No weakness noted on physical exam.  He is not taking any pain medications for his neck. His upper extremity radicular symptoms have resolved. No incisional difficulties noted. He may begin to wean out of cervical soft collar. Continued restrictions discussed. He will follow-up in 4 weeks, at which time he may do outpatient physical therapy if he would like. He will call our office with any worsening of his symptoms. Additionally, he has complaints of persistent left knee pain, he will see one of our joint providers today. 1. S/P cervical spinal fusion  -     XR SPINE CERV PA LAT ODONT 3 V MAX; Future      No follow-ups on file. SUBJECTIVE/OBJECTIVE:  Niki Hart (: 1970) is a 47 y.o. male. Pain Assessment  2023   Location of Pain Neck   Location Modifiers Posterior   Severity of Pain 6        Patient presents today approximately 2 weeks status post recent ACDF. He is doing well overall. He states his upper extremity radicular pain has resolved. He does have some muscle tightness in his upper thoracic region. He is not taking any pain medications at this time. Denies any incisional drainage, fevers or chills. He states he has been mostly been compliant with wearing his cervical soft collar. He states he has been compliant with bending, lifting and twisting restrictions. No other acute changes in symptoms.     Imaging:    XR Results (most recent):  Results from Appointment encounter on 23    XR SPINE CERV PA LAT ODONT 3 V MAX    Narrative  AP and lateral of the cervical spine reviewed today. Stable C5-C7 revision cervical fusion. No acute changes              No Known Allergies    Current Outpatient Medications   Medication Sig    mometasone (NASONEX) 50 mcg/actuation nasal spray 2 Sprays daily. amLODIPine (NORVASC) 5 mg tablet nightly. atorvastatin (LIPITOR) 20 mg tablet nightly. carvediloL (COREG) 25 mg tablet two (2) times a day. DULoxetine (CYMBALTA) 30 mg capsule 30 mg two (2) times a day. hydroCHLOROthiazide (HYDRODIURIL) 25 mg tablet daily. lisinopriL (PRINIVIL, ZESTRIL) 10 mg tablet daily. pregabalin (LYRICA) 100 mg capsule two (2) times a day. fluticasone propion-salmeteroL (ADVAIR/WIXELA) 100-50 mcg/dose diskus inhaler Take 1 Puff by inhalation every twelve (12) hours. No current facility-administered medications for this visit. Past Medical History:   Diagnosis Date    Asthma     Chronic pain     LEFT LEG, BACK    GERD (gastroesophageal reflux disease)     Hypertension     Sleep apnea     CPAP        Past Surgical History:   Procedure Laterality Date    HX BACK SURGERY  04/15/2019    CERVICAL FUSION    HX BACK SURGERY  2019    LAMINECTOMY    HX BACK SURGERY  11/10/2020    SPINAL STIMULATOR    HX BACK SURGERY  2021    LAMINECTOMY    HX COLONOSCOPY  2023    2 POLYPS REMOVED    HX ENDOSCOPY      HX VASECTOMY      HX WISDOM TEETH EXTRACTION         Family History   Problem Relation Age of Onset    No Known Problems Mother     No Known Problems Father     No Known Problems Brother     Anesth Problems Neg Hx         Social History     Tobacco Use    Smoking status: Former     Packs/day: 1.00     Years: 33.00     Pack years: 33.00     Types: Cigarettes     Quit date:      Years since quittin.1    Smokeless tobacco: Former     Quit date: 1999   Vaping Use    Vaping Use: Never used   Substance Use Topics    Alcohol use:  Yes     Alcohol/week: 21.0 standard drinks     Types: 21 Shots of liquor per week    Drug use: Never        Review of Systems   Constitutional:  Negative for chills and fever. Musculoskeletal:  Positive for neck stiffness. Negative for neck pain. Neurological:  Negative for weakness. All other systems reviewed and are negative. Vitals:  Ht 6' 3\" (1.905 m)   Wt 285 lb (129.3 kg)   BMI 35.62 kg/m²    Body mass index is 35.62 kg/m². Physical Exam  Integumentary  Assessment of Surgical Incision - healing and consistent with normal anticipated wound healing. Neurologic  Overall Assessment of Muscle Strength and Tone reveals  Upper Extremities - Right Deltoid - 5/5. Left Deltoid - 5/5. Right Bicep - 5/5. Left Bicep - 5/5. Right Tricep - 5/5. Left Tricep - 5/5. Right Wrist Extensors - 5/5. Left Wrist Extensors - 5/5. Right Wrist Flexors - 5/5. Left Wrist Flexors - 5/5. Right Intrinsics - 5/5. Left Intrinsics - 5/5. General Assessment of Reflexes  Right Hand - Mcintosh's sign is negative in the right hand. Left Hand - Mcintosh's sign is negative in the left hand. Reflexes (Dermatomes)  2/2 Normal - Left Bicep (C5-6), Left Tricep (C7-8), Left Brachioradialis (C5-6), Right Bicep (C5-6), Right Tricep (C7-8) and Right Brachioradialis (C5-6). Musculoskeletal  Global Assessment  Examination of related systems reveals - well-developed, well-nourished, in no acute distress, alert and oriented x 3 and normal coordination. Gait and Station - normal gait and station and normal posture. Spine/Ribs/Pelvis  Cervical Spine - Evaluation of related systems reveals - no lymphadenopathy and neurovascularly intact bilaterally. Inspection and Palpation - Tenderness -mild and localized. Assessment of pain reveals the following findings: - Location - cervical area. Dr. Julian Coronel was available for immediate consult during this encounter. An electronic signature was used to authenticate this note.   -- ENRRIQUE Lund

## (undated) DEVICE — SUTURE ABSRB L30CM 2-0 VLT SPRL PDS + STRATAFIX SXPP1B410

## (undated) DEVICE — FLOSEAL HEMOSTATIC MATRIX, 5ML: Brand: FLOSEAL HEMOSTATIC MATRIX

## (undated) DEVICE — COVER,MAYO STAND,STERILE: Brand: MEDLINE

## (undated) DEVICE — SOLUTION IRRIG 1000ML 0.9% SOD CHL USP POUR PLAS BTL

## (undated) DEVICE — STRIP,CLOSURE,WOUND,MEDI-STRIP,1/2X4: Brand: MEDLINE

## (undated) DEVICE — SOLUTION IV 250ML 0.9% SOD CHL CLR INJ FLX BG CONT PRT CLSR

## (undated) DEVICE — LAMINECTOMY-SMH: Brand: MEDLINE INDUSTRIES, INC.

## (undated) DEVICE — APPLICATOR PREP 26ML 0.7% IOD POVACRYLEX 74% ISO ALC ST

## (undated) DEVICE — DRAIN SURG FLAT W7MMXL20CM FULL PERF

## (undated) DEVICE — TELFA NON-ADHERENT ABSORBENT DRESSING: Brand: TELFA

## (undated) DEVICE — GLOVE SURG SZ 8 L12IN FNGR THK79MIL GRN LTX FREE

## (undated) DEVICE — SUTURE STRATAFIX SYMMETRIC SZ 1 L18IN ABSRB VLT CT1 L36CM SXPP1A404

## (undated) DEVICE — POSITIONER HD REST FOAM CMFRT TCH

## (undated) DEVICE — DRILL BIT 7080510 11 MM DRILL BIT S

## (undated) DEVICE — TAPE,CLOTH/SILK,CURAD,3"X10YD,LF,40/CS: Brand: CURAD

## (undated) DEVICE — BIPOLAR IRRIGATOR INTEGRATED TUBING AND BIPOLAR CORD SET, DISPOSABLE

## (undated) DEVICE — SUTURE STRATAFIX SPRL MCRYL + SZ 3 0 L8IN ABSRB UD L26MM SH SXMP1B427

## (undated) DEVICE — SYRINGE MED 30ML STD CLR PLAS LUERLOCK TIP N CTRL DISP

## (undated) DEVICE — GLOVE SURG SZ 75 L12IN FNGR THK94MIL STD WHT LTX FREE

## (undated) DEVICE — 4-PORT MANIFOLD: Brand: NEPTUNE 2

## (undated) DEVICE — SUT STRATA PGA 3-0 PS-2 60CM -- STRATAFIX PGA PCL

## (undated) DEVICE — C-ARM: Brand: UNBRANDED

## (undated) DEVICE — TOOL 14MH30 LEGEND 14CM 3MM: Brand: MIDAS REX ™

## (undated) DEVICE — PAD,ABDOMINAL,5"X9",ST,LF,25/BX: Brand: MEDLINE INDUSTRIES, INC.

## (undated) DEVICE — PREP SKN DURAPREP 26ML APPL --

## (undated) DEVICE — DEVICE SKIN CLOSURE 4 MM INCISION

## (undated) DEVICE — RESERVOIR,SUCTION,100CC,SILICONE: Brand: MEDLINE

## (undated) DEVICE — BONE WAX WHITE: Brand: BONE WAX WHITE

## (undated) DEVICE — HALTER TRACTION HD W/ TRI COTTON LINING FOAM LTX

## (undated) DEVICE — ADHESIVE LIQ 2OZ ADJUNCT FOR DSG MASTISOL

## (undated) DEVICE — MASTISOL ADHESIVE LIQ 2/3ML

## (undated) DEVICE — ANTERIOR CERVICAL-SMH: Brand: MEDLINE INDUSTRIES, INC.